# Patient Record
Sex: FEMALE | Race: WHITE | NOT HISPANIC OR LATINO | Employment: OTHER | URBAN - METROPOLITAN AREA
[De-identification: names, ages, dates, MRNs, and addresses within clinical notes are randomized per-mention and may not be internally consistent; named-entity substitution may affect disease eponyms.]

---

## 2017-02-17 ENCOUNTER — TELEPHONE (OUTPATIENT)
Dept: OBSTETRICS AND GYNECOLOGY | Facility: CLINIC | Age: 67
End: 2017-02-17

## 2017-02-17 DIAGNOSIS — Z12.31 VISIT FOR SCREENING MAMMOGRAM: Primary | ICD-10-CM

## 2017-03-06 ENCOUNTER — HOSPITAL ENCOUNTER (OUTPATIENT)
Dept: RADIOLOGY | Facility: OTHER | Age: 67
Discharge: HOME | End: 2017-03-06
Attending: OBSTETRICS & GYNECOLOGY
Payer: COMMERCIAL

## 2017-03-06 ENCOUNTER — OFFICE VISIT (OUTPATIENT)
Dept: OBSTETRICS AND GYNECOLOGY | Facility: CLINIC | Age: 67
End: 2017-03-06
Payer: COMMERCIAL

## 2017-03-06 VITALS
SYSTOLIC BLOOD PRESSURE: 109 MMHG | BODY MASS INDEX: 20.37 KG/M2 | WEIGHT: 107.81 LBS | DIASTOLIC BLOOD PRESSURE: 72 MMHG

## 2017-03-06 DIAGNOSIS — Z12.31 VISIT FOR SCREENING MAMMOGRAM: ICD-10-CM

## 2017-03-06 DIAGNOSIS — M85.80 OSTEOPENIA: ICD-10-CM

## 2017-03-06 DIAGNOSIS — N95.9 MENOPAUSAL AND PERIMENOPAUSAL DISORDER: ICD-10-CM

## 2017-03-06 DIAGNOSIS — Z01.419 VISIT FOR GYNECOLOGIC EXAMINATION: Primary | ICD-10-CM

## 2017-03-06 PROCEDURE — 99396 PREV VISIT EST AGE 40-64: CPT | Mod: S$GLB,,, | Performed by: OBSTETRICS & GYNECOLOGY

## 2017-03-06 PROCEDURE — 99999 PR PBB SHADOW E&M-EST. PATIENT-LVL II: CPT | Mod: PBBFAC,,, | Performed by: OBSTETRICS & GYNECOLOGY

## 2017-03-06 PROCEDURE — 77063 BREAST TOMOSYNTHESIS BI: CPT | Mod: 26,,, | Performed by: RADIOLOGY

## 2017-03-06 PROCEDURE — 77067 SCR MAMMO BI INCL CAD: CPT | Mod: 26,,, | Performed by: RADIOLOGY

## 2017-03-06 PROCEDURE — 77080 DXA BONE DENSITY AXIAL: CPT | Mod: 26,,, | Performed by: RADIOLOGY

## 2017-03-06 PROCEDURE — 77080 DXA BONE DENSITY AXIAL: CPT | Mod: TC

## 2017-03-06 PROCEDURE — 77067 SCR MAMMO BI INCL CAD: CPT | Mod: TC

## 2017-03-06 NOTE — PROGRESS NOTES
HISTORY OF PRESENT ILLNESS:    Zaira Bansal is a 66 y.o. female , presents for a routine exam and has no complaints.  PAP NEXT YEAR, AND MAMMO HAS BEEN SCHEDULED.  HAS HX OF OSTEOPENIA AND NOT CURRENTLY TAKING ACTONEL - F/U BMD AS HAS BEEN YEARS SINCE LAST CHECKED.  RECENT OUSMANE WITH DAUGHTER AND THE GRANDKIDS.  SINCE LAST VISIT MOVED TO [facility].  COMFORT MEASURES FOR URI    LAST VISIT 2016:  PAP DUE AND SUBMITTED; MAMMO HAS BEED SCHEDULED FOR TOMORROW - NO C/O AT ALL  LAST VISIT :  PAP UP TO DATE, DUE  AND MAMMO DUE NEXT MONTH, ORDERED. NO GYN ISSUES  LAST VISIT 2013:  PAP UP TO DATE AND MAMMO ORDERED; HAS ALREADY HAD ORDER FOR DEXA SCAN ENTERED TOO. NO HOT FLUSHES AND VAGINAL DRYNESS.  SON GETTING  IN   LAST ROUTINE VISIT 2012:  DOING WELL, AND NO C/O. HER DAUGHTER JUST HAD A GRANDDAUGHTER IN [place] . . refills FOR MAMMO. PAP SUBMITTED, DISCUSSED 3YR SCREENING INTERVAL. NO T HOT FLUSHES THAT ARE SEVERE, NO SWATI SX   HAPPILY RETIRED FROM [employer]. JUST RECENTLY BOUGHT VACATION HOUSE IN [facility]     History reviewed. No pertinent past medical history.    Past Surgical History:   Procedure Laterality Date    DILATION AND CURETTAGE OF UTERUS          MEDICATIONS AND ALLERGIES:      Current Outpatient Prescriptions:     fluticasone (FLONASE) 50 mcg/actuation nasal spray, , Disp: , Rfl: 11    Review of patient's allergies indicates:   Allergen Reactions    Compazine [prochlorperazine edisylate]        Family History   Problem Relation Age of Onset    Glaucoma Mother     Cancer Other     Uterine cancer Maternal Grandmother     Blindness Neg Hx     Cataracts Neg Hx     Diabetes Neg Hx     Breast cancer Neg Hx     Colon cancer Neg Hx     Ovarian cancer Neg Hx        Social History     Social History    Marital status:      Spouse name: N/A    Number of children: N/A    Years of education: N/A     Occupational History    Not on file.      Social History Main Topics    Smoking status: Never Smoker    Smokeless tobacco: Never Used    Alcohol use Yes    Drug use: No    Sexual activity: Yes     Partners: Male     Birth control/ protection: Post-menopausal     Other Topics Concern    Not on file     Social History Narrative       COMPREHENSIVE GYN HISTORY:  PAP History:  Denies abnormal Paps except a noted above.  Infection History: Denies STDs. Denies PID.  Benign History: Denies uterine fibroids. Denies ovarian cysts. Denies endometriosis.  Denies other conditions.  Cancer History: Denies cervical cancer. Denies uterine cancer or hyperplasia. Denies ovarian cancer. Denies vulvar cancer or pre-cancer. Denies vaginal cancer or pre-cancer. Denies breast cancer. Denies colon cancer.    ROS:  GENERAL: No weight changes. No swelling. No fatigue. No fever.  CARDIOVASCULAR: No chest pain. No shortness of breath. No leg cramps.   NEUROLOGICAL: No headaches. No vision changes.  BREASTS: No pain. No lumps. No discharge.  ABDOMEN: No pain. No nausea. No vomiting. No diarrhea. No constipation.  REPRODUCTIVE: No abnormal bleeding.   VULVA: No pain. No lesions. No itching.  VAGINA: No relaxation. No itching. No odor. No discharge. No lesions.  URINARY: No incontinence. No nocturia. No frequency. No dysuria.    /72  Wt 48.9 kg (107 lb 12.9 oz)  BMI 20.37 kg/m2    PE:  APPEARANCE: Well nourished, well developed, in no acute distress.  AFFECT: WNL, alert and oriented x 3.  SKIN: No hirsutism or acne.  NECK: Neck symmetric without masses or thyromegaly.  NODES: No inguinal, cervical, axillary or femoral lymph node enlargement.  CHEST: Good respiratory effort.   ABDOMEN: Soft. No tenderness or masses. No hepatosplenomegaly. No hernias.  BREASTS: Symmetrical, no skin changes or visible lesions. No palpable masses, nipple discharge bilaterally.  PELVIC: ATROPHIC EXTERNAL FEMALE GENITALIA without lesions. Normal hair distribution. Adequate perineal body,  normal urethral meatus. VAGINA DRY without lesions or discharge. CERVIX STENOTIC without lesions, discharge or tenderness. No significant cystocele or rectocele. Bimanual exam shows uterus to be normal size, regular, mobile and nontender. Adnexa without masses or tenderness.  EXTREMITIES: No edema.    PROCEDURES:  Pap    DIAGNOSIS:  1. Visit for gynecologic examination     2. Menopausal and perimenopausal disorder     3. Osteopenia  DXA Bone Density Spine And Hip_Axial Skeleton       PLAN:    LABS AND TESTS ORDERED:  Mammogram    COUNSELING:  The patient was counseled today on osteoporosis prevention, calcium supplementation, and regular weight bearing exercise. The patient was also counseled today on ACS PAP guidelines, with recommendations for yearly pelvic exams unless their uterus, cervix, and ovaries were removed for benign reasons; in that case, examinations every 3-5 years are recommended.  The patient was also counseled regarding monthly breast self-examination, routine STD screening for at-risk populations, prophylactic immunizations for transmitted infections such as  HPV, Pertussis, or Influenza as appropriate, and yearly mammograms when indicated by ACS guidelines.  She was advised to see her primary care physician for all other health maintenance.    FOLLOW-UP with me annually.

## 2017-09-20 ENCOUNTER — OFFICE VISIT (OUTPATIENT)
Dept: OBSTETRICS AND GYNECOLOGY | Facility: CLINIC | Age: 67
End: 2017-09-20
Payer: COMMERCIAL

## 2017-09-20 VITALS
WEIGHT: 111.13 LBS | DIASTOLIC BLOOD PRESSURE: 70 MMHG | HEIGHT: 60 IN | BODY MASS INDEX: 21.82 KG/M2 | SYSTOLIC BLOOD PRESSURE: 128 MMHG

## 2017-09-20 DIAGNOSIS — R10.31 RLQ ABDOMINAL PAIN: Primary | ICD-10-CM

## 2017-09-20 PROCEDURE — 99214 OFFICE O/P EST MOD 30 MIN: CPT | Mod: S$GLB,,, | Performed by: OBSTETRICS & GYNECOLOGY

## 2017-09-20 PROCEDURE — 99999 PR PBB SHADOW E&M-EST. PATIENT-LVL II: CPT | Mod: PBBFAC,,, | Performed by: OBSTETRICS & GYNECOLOGY

## 2017-09-20 PROCEDURE — 3008F BODY MASS INDEX DOCD: CPT | Mod: S$GLB,,, | Performed by: OBSTETRICS & GYNECOLOGY

## 2017-09-20 NOTE — PROGRESS NOTES
HISTORY OF PRESENT ILLNESS:    Zaira Bansal is a 66 y.o. female , presents C/O PELVIC PAIN SINCE July - WAS SEEN [facility] AND REF TO PHYS THERAPY - WANTS TO BE SURE NOT GYN CAUSE.  3 MO RLQ PAIN, RADIATES FROM BACK, WORSE WHEN INACTIVE AND BETTER WITH ACTIVITY.  WORRIES RE OV NEOPLASM - WILL CHECK PELVIC USG BUT ADVISED MORE LIKELY MUSCULOSKEL OR NERVE IMPINGEMENT AND REASSURED NL EXAM.  ADVISED PT TO F/U WITH PT AND POSS BENEFIT FROM EVAL WITH ORTHO, BACK SPECIALIST    LAST ROUTINE VISIT 3/2017:   PAP NEXT YEAR, AND MAMMO HAS BEEN SCHEDULED.  HAS HX OF OSTEOPENIA AND NOT CURRENTLY TAKING ACTONEL - F/U BMD AS HAS BEEN YEARS SINCE LAST CHECKED.  RECENT OUSMANE WITH DAUGHTER AND THE GRANDKIDS.  SINCE LAST VISIT MOVED TO [facility].  COMFORT MEASURES FOR URI  LAST VISIT 2016:  PAP DUE AND SUBMITTED; MAMMO HAS BEED SCHEDULED FOR TOMORROW - NO C/O AT ALL  LAST VISIT :  PAP UP TO DATE, DUE  AND MAMMO DUE NEXT MONTH, ORDERED. NO GYN ISSUES  LAST VISIT 2013:  PAP UP TO DATE AND MAMMO ORDERED; HAS ALREADY HAD ORDER FOR DEXA SCAN ENTERED TOO. NO HOT FLUSHES AND VAGINAL DRYNESS.  SON GETTING  IN   LAST ROUTINE VISIT 2012:  DOING WELL, AND NO C/O. HER DAUGHTER JUST HAD A GRANDDAUGHTER IN [place] . . refills FOR MAMMO. PAP SUBMITTED, DISCUSSED 3YR SCREENING INTERVAL. NO T HOT FLUSHES THAT ARE SEVERE, NO SWATI SX   HAPPILY RETIRED FROM [employer]. JUST RECENTLY BOUGHT VACATION HOUSE IN [facility]     History reviewed. No pertinent past medical history.    Past Surgical History:   Procedure Laterality Date    DILATION AND CURETTAGE OF UTERUS          MEDICATIONS AND ALLERGIES:      Current Outpatient Prescriptions:     metaxalone (SKELAXIN) 800 MG tablet, Take 1 tablet (800 mg total) by mouth 3 (three) times daily., Disp: 30 tablet, Rfl: 3    Review of patient's allergies indicates:   Allergen Reactions    Compazine [prochlorperazine edisylate]        Family History   Problem  Relation Age of Onset    Glaucoma Mother     Lung cancer Father     Cancer Other     Uterine cancer Maternal Grandmother     Blindness Neg Hx     Cataracts Neg Hx     Diabetes Neg Hx     Breast cancer Neg Hx     Colon cancer Neg Hx     Ovarian cancer Neg Hx        Social History     Social History    Marital status:      Spouse name: N/A    Number of children: N/A    Years of education: N/A     Occupational History    Not on file.     Social History Main Topics    Smoking status: Never Smoker    Smokeless tobacco: Never Used    Alcohol use Yes    Drug use: No    Sexual activity: Yes     Partners: Male     Birth control/ protection: Post-menopausal     Other Topics Concern    Not on file     Social History Narrative    No narrative on file       COMPREHENSIVE GYN HISTORY:  PAP History:  Denies abnormal Paps except a noted above.  Infection History: Denies STDs. Denies PID.  Benign History: Denies uterine fibroids. Denies ovarian cysts. Denies endometriosis.  Denies other conditions.  Cancer History: Denies cervical cancer. Denies uterine cancer or hyperplasia. Denies ovarian cancer. Denies vulvar cancer or pre-cancer. Denies vaginal cancer or pre-cancer. Denies breast cancer. Denies colon cancer.    ROS:  GENERAL: No weight changes. No swelling. No fatigue. No fever.  CARDIOVASCULAR: No chest pain. No shortness of breath. No leg cramps.   NEUROLOGICAL: No headaches. No vision changes.  BREASTS: No pain. No lumps. No discharge.  ABDOMEN: No nausea. No vomiting. No diarrhea. No constipation.  REPRODUCTIVE: No abnormal bleeding.   VULVA: No pain. No lesions. No itching.  VAGINA: No relaxation. No itching. No odor. No discharge. No lesions.  URINARY: No incontinence. No nocturia. No frequency. No dysuria.    /70   Ht 5' (1.524 m)   Wt 50.4 kg (111 lb 1.8 oz)   BMI 21.70 kg/m²     PE:  CHEST: Good respiratory effort.   ABDOMEN: Soft. No tenderness or masses. No hepatosplenomegaly. No  hernias.  BREASTS: Symmetrical, no skin changes or visible lesions. No palpable masses, nipple discharge bilaterally.  PELVIC: ATROPHIC EXTERNAL FEMALE GENITALIA without lesions. Normal hair distribution. Adequate perineal body, normal urethral meatus. VAGINA DRY without lesions or discharge. CERVIX STENOTIC without lesions, discharge or tenderness. No significant cystocele or rectocele. Bimanual exam shows uterus to be normal size, regular, mobile and nontender. Adnexa without masses or tenderness.  EXTREMITIES: No edema.    PROCEDURES:      DIAGNOSIS:  1. RLQ abdominal pain  US Pelvis Comp with Transvag NON-OB (xpd       PLAN:    LABS AND TESTS ORDERED:      COUNSELING:  The patient was counseled today on CAUSES OF PELVIC PAIN    FOLLOW-UP with me annually.

## 2017-09-22 ENCOUNTER — HOSPITAL ENCOUNTER (OUTPATIENT)
Dept: RADIOLOGY | Facility: HOSPITAL | Age: 67
Discharge: HOME | End: 2017-09-22
Attending: OBSTETRICS & GYNECOLOGY
Payer: COMMERCIAL

## 2017-09-22 DIAGNOSIS — R10.31 RLQ ABDOMINAL PAIN: ICD-10-CM

## 2017-09-22 PROCEDURE — 76830 TRANSVAGINAL US NON-OB: CPT | Mod: 26,,, | Performed by: RADIOLOGY

## 2017-09-22 PROCEDURE — 76856 US EXAM PELVIC COMPLETE: CPT | Mod: TC,PO

## 2017-09-22 PROCEDURE — 76856 US EXAM PELVIC COMPLETE: CPT | Mod: 26,,, | Performed by: RADIOLOGY

## 2017-09-26 ENCOUNTER — TELEPHONE (OUTPATIENT)
Dept: OBSTETRICS AND GYNECOLOGY | Facility: CLINIC | Age: 67
End: 2017-09-26

## 2017-09-26 NOTE — TELEPHONE ENCOUNTER
REASSURED FIBROID SMALL, SHOULD BE ASYMPTOMATIC AND NOT CAUSE FOR BACK PAIN      Transabdominal and transvaginal ultrasound evaluation of the pelvis was performed.    Findings:    The uterus measuresthe uterus measures 6.2 x 2.6 x 3.7 cm in dimension.  Endometrial stripe measures 2 mm.  There are 2 intramural uterine fundal fibroids present.  The larger fibroid is calcified and measures 2.4 x 2.1 x 2.5 cm within the right anterior uterine fundus.. There is a small left posterior fundal intramural fibroid which measures 1.1 x 0.7 x 1.1 cm.  The endometrial stripe measures 2 mm.  There is a small endometrial calcification present.    The right ovary measures 2.1 x 1.0 x 1.1 cm.  The left ovary measures 2.0 x 1.1 x 1.3 cm.  No solid or cystic adnexal mass.  No free fluid in the cul-de-sac.   Impression      Uterine fibroids         ----- Message from Ester Betancourt MA sent at 9/26/2017 10:42 AM CDT -----      ----- Message -----  From: Atif Nye  Sent: 9/25/2017  11:37 AM  To: Ileana DOMÍNGUEZ Staff    Please call pt she has some question regarding the results of her u/s [phone]

## 2017-10-11 ENCOUNTER — OFFICE VISIT (OUTPATIENT)
Dept: SPINE | Facility: CLINIC | Age: 67
End: 2017-10-11
Payer: MEDICARE

## 2017-10-11 VITALS
DIASTOLIC BLOOD PRESSURE: 68 MMHG | SYSTOLIC BLOOD PRESSURE: 101 MMHG | HEIGHT: 61 IN | WEIGHT: 110.25 LBS | HEART RATE: 80 BPM | BODY MASS INDEX: 20.82 KG/M2

## 2017-10-11 DIAGNOSIS — M54.5 ACUTE RIGHT-SIDED LOW BACK PAIN, WITH SCIATICA PRESENCE UNSPECIFIED: ICD-10-CM

## 2017-10-11 DIAGNOSIS — M25.551 PAIN OF RIGHT HIP JOINT: Primary | ICD-10-CM

## 2017-10-11 PROCEDURE — 99203 OFFICE O/P NEW LOW 30 MIN: CPT | Mod: S$PBB,,, | Performed by: PHYSICIAN ASSISTANT

## 2017-10-11 PROCEDURE — 99214 OFFICE O/P EST MOD 30 MIN: CPT | Mod: PBBFAC,PN | Performed by: PHYSICIAN ASSISTANT

## 2017-10-11 PROCEDURE — 99999 PR PBB SHADOW E&M-EST. PATIENT-LVL IV: CPT | Mod: PBBFAC,,, | Performed by: PHYSICIAN ASSISTANT

## 2017-10-12 NOTE — PROGRESS NOTES
Neurosurgery History & Physical    Patient ID: Zaira Bansal is a 66 y.o. female.    Chief Complaint   Patient presents with    Low-back Pain     and around right front       Review of Systems   Constitutional: Negative for activity change, appetite change, chills, fever and unexpected weight change.   HENT: Negative for tinnitus, trouble swallowing and voice change.    Respiratory: Negative for apnea, cough, chest tightness and shortness of breath.    Cardiovascular: Negative for chest pain and palpitations.   Gastrointestinal: Negative for constipation, diarrhea, nausea and vomiting.   Genitourinary: Negative for difficulty urinating, dysuria, frequency and urgency.   Musculoskeletal: Positive for arthralgias and back pain. Negative for gait problem, neck pain and neck stiffness.   Skin: Negative for wound.   Neurological: Negative for dizziness, tremors, seizures, facial asymmetry, speech difficulty, weakness, light-headedness, numbness and headaches.   Psychiatric/Behavioral: Negative for confusion and decreased concentration.       No past medical history on file.  Social History     Social History    Marital status:      Spouse name: N/A    Number of children: N/A    Years of education: N/A     Occupational History    Not on file.     Social History Main Topics    Smoking status: Never Smoker    Smokeless tobacco: Never Used    Alcohol use Yes    Drug use: No    Sexual activity: Yes     Partners: Male     Birth control/ protection: Post-menopausal     Other Topics Concern    Not on file     Social History Narrative    No narrative on file     Family History   Problem Relation Age of Onset    Glaucoma Mother     Lung cancer Father     Cancer Other     Uterine cancer Maternal Grandmother     Blindness Neg Hx     Cataracts Neg Hx     Diabetes Neg Hx     Breast cancer Neg Hx     Colon cancer Neg Hx     Ovarian cancer Neg Hx      Review of patient's allergies indicates:   Allergen  "Reactions    Compazine [prochlorperazine edisylate]        Current Outpatient Prescriptions:     metaxalone (SKELAXIN) 800 MG tablet, Take 1 tablet (800 mg total) by mouth 3 (three) times daily., Disp: 30 tablet, Rfl: 3    Vitals:    10/11/17 1401   BP: 101/68   BP Location: Right arm   Patient Position: Sitting   BP Method: Medium (Automatic)   Pulse: 80   Weight: 50 kg (110 lb 3.7 oz)   Height: 5' 1" (1.549 m)       Physical Exam   Constitutional: She is oriented to person, place, and time. She appears well-developed and well-nourished.   HENT:   Head: Normocephalic and atraumatic.   Eyes: Pupils are equal, round, and reactive to light.   Neck: Normal range of motion. Neck supple.   Cardiovascular: Normal rate.    Pulmonary/Chest: Effort normal.   Musculoskeletal: Normal range of motion. She exhibits no edema.   Right hip pain with internal/ external rotation of the right hip   Neurological: She is alert and oriented to person, place, and time. She has a normal Finger-Nose-Finger Test, a normal Heel to Shin Test, a normal Romberg Test and a normal Tandem Gait Test. Gait normal.   Reflex Scores:       Tricep reflexes are 2+ on the right side and 2+ on the left side.       Bicep reflexes are 2+ on the right side and 2+ on the left side.       Brachioradialis reflexes are 2+ on the right side and 2+ on the left side.       Patellar reflexes are 2+ on the right side and 2+ on the left side.       Achilles reflexes are 2+ on the right side and 2+ on the left side.  Skin: Skin is warm, dry and intact.   Psychiatric: She has a normal mood and affect. Her speech is normal and behavior is normal. Judgment and thought content normal.   Nursing note and vitals reviewed.      Neurologic Exam     Mental Status   Oriented to person, place, and time.   Oriented to person.   Oriented to place.   Oriented to time.   Follows 3 step commands.   Attention: normal. Concentration: normal.   Speech: speech is normal   Level of " consciousness: alert  Knowledge: consistent with education.   Able to name object. Able to read. Able to repeat. Able to write. Normal comprehension.     Cranial Nerves     CN II   Visual acuity: normal  Right visual field deficit: none  Left visual field deficit: none     CN III, IV, VI   Pupils are equal, round, and reactive to light.  Right pupil: Size: 3 mm. Shape: regular. Reactivity: brisk. Consensual response: intact.   Left pupil: Size: 3 mm. Shape: regular. Reactivity: brisk. Consensual response: intact.   CN III: no CN III palsy  CN VI: no CN VI palsy  Nystagmus: none   Diplopia: none  Ophthalmoparesis: none  Conjugate gaze: present    CN V   Right facial sensation deficit: none  Left facial sensation deficit: none    CN VII   Right facial weakness: none  Left facial weakness: none    CN VIII   Hearing: intact    CN IX, X   CN IX normal.   CN X normal.     CN XI   Right sternocleidomastoid strength: normal  Left sternocleidomastoid strength: normal  Right trapezius strength: normal  Left trapezius strength: normal    CN XII   Fasciculations: absent  Tongue deviation: none    Motor Exam   Muscle bulk: normal  Overall muscle tone: normal  Right arm pronator drift: absent  Left arm pronator drift: absent    Strength   Right neck flexion: 5/5  Left neck flexion: 5/5  Right neck extension: 5/5  Left neck extension: 5/5  Right deltoid: 5/5  Left deltoid: 5/5  Right biceps: 5/5  Left biceps: 5/5  Right triceps: 5/5  Left triceps: 5/5  Right wrist flexion: 5/5  Left wrist flexion: 5/5  Right wrist extension: 5/5  Left wrist extension: 5/5  Right interossei: 5/5  Left interossei: 5/5  Right abdominals: 5/5  Left abdominals: 5/5  Right iliopsoas: 5/5  Left iliopsoas: 5/5  Right quadriceps: 5/5  Left quadriceps: 5/5  Right hamstrin/5  Left hamstrin/5  Right glutei: 5/5  Left glutei: 5/5  Right anterior tibial: 5/5  Left anterior tibial: 5/5  Right posterior tibial: 5/5  Left posterior tibial: 5/5  Right  peroneal: 5/5  Left peroneal: 5/5  Right gastroc: 5/5  Left gastroc: 5/5    Sensory Exam   Right arm light touch: normal  Left arm light touch: normal  Right leg light touch: normal  Left leg light touch: normal  Right arm vibration: normal  Left arm vibration: normal  Right arm pinprick: normal  Left arm pinprick: normal  Sensory deficit distribution on right: L1    Gait, Coordination, and Reflexes     Gait  Gait: normal    Coordination   Romberg: negative  Finger to nose coordination: normal  Heel to shin coordination: normal  Tandem walking coordination: normal    Tremor   Resting tremor: absent  Intention tremor: absent  Action tremor: absent    Reflexes   Right brachioradialis: 2+  Left brachioradialis: 2+  Right biceps: 2+  Left biceps: 2+  Right triceps: 2+  Left triceps: 2+  Right patellar: 2+  Left patellar: 2+  Right achilles: 2+  Left achilles: 2+  Right Traore: absent  Left Traore: absent  Right ankle clonus: absent  Left ankle clonus: absent      Provider dictation:  66 year old  female with osteopenia is self referred for right lower back and hip pain.  She has had pain since June 2017 that started as a gradual ache across the lower back.  Pain has progressed and is now constant in the right lower back radiating to the right groin.  It is worse in the morning upon waking and improves with walking and the use of ice.  She feels numbness in the right hip and lateral/ superior thigh.  She has tried skelaxin, ibuprofen and naproxen for pain.  PT has not helped her over the last 2-3 months.  She has not had SILVA.  Oswestry score: 18%.  PHQ:  1.    On exam, she has decreased sensation in a right L1 distribution and pain is elicitted in the right hip with internal/ external rotation of the right hip joint.    She has not had any imaging of the lumbar spine or hip to review.    Mrs. Bansal has acute onset right lower back/ hip pain with possible right L1 radiculopathy.  She has not had relief with  NSAIDs, muscle relaxants or PT.  We will obtain an MRI lumbar spine as well as lumbar and hip xrays to assess for source of pain.  Follow up in clinic after imaging is complete.     Visit Diagnosis:  Pain of right hip joint  -     X-Ray Hip 2 View Right; Future; Expected date: 10/11/2017    Acute right-sided low back pain, with sciatica presence unspecified  -     MRI Lumbar Spine Without Contrast; Future; Expected date: 10/11/2017  -     X-Ray Lumbar Complete With Flex And Ext; Future; Expected date: 10/11/2017        Total time spent counseling greater than fifty percent of total visit time.  Counseling included discussion regarding imaging findings, diagnosis possibilities, treatment options, risks and benefits.   The patient had many questions regarding the options and long-term effects.

## 2017-10-17 PROBLEM — M62.830 SPASM OF MUSCLE, BACK: Status: ACTIVE | Noted: 2017-10-17

## 2017-11-06 ENCOUNTER — OFFICE VISIT (OUTPATIENT)
Dept: SPINE | Facility: CLINIC | Age: 67
End: 2017-11-06
Payer: MEDICARE

## 2017-11-06 ENCOUNTER — HOSPITAL ENCOUNTER (OUTPATIENT)
Dept: RADIOLOGY | Facility: HOSPITAL | Age: 67
Discharge: HOME | End: 2017-11-06
Attending: PHYSICIAN ASSISTANT
Payer: MEDICARE

## 2017-11-06 VITALS
DIASTOLIC BLOOD PRESSURE: 68 MMHG | HEIGHT: 61 IN | BODY MASS INDEX: 21.1 KG/M2 | SYSTOLIC BLOOD PRESSURE: 105 MMHG | HEART RATE: 72 BPM | WEIGHT: 111.75 LBS

## 2017-11-06 DIAGNOSIS — M54.5 ACUTE RIGHT-SIDED LOW BACK PAIN, WITH SCIATICA PRESENCE UNSPECIFIED: ICD-10-CM

## 2017-11-06 DIAGNOSIS — M54.5 ACUTE RIGHT-SIDED LOW BACK PAIN, WITH SCIATICA PRESENCE UNSPECIFIED: Primary | ICD-10-CM

## 2017-11-06 DIAGNOSIS — Q76.2 CONGENITAL SPONDYLOLISTHESIS OF LUMBAR REGION: ICD-10-CM

## 2017-11-06 DIAGNOSIS — M25.551 PAIN OF RIGHT HIP JOINT: ICD-10-CM

## 2017-11-06 DIAGNOSIS — M47.816 LUMBAR SPONDYLOSIS: ICD-10-CM

## 2017-11-06 PROCEDURE — 72114 X-RAY EXAM L-S SPINE BENDING: CPT | Mod: TC,PO

## 2017-11-06 PROCEDURE — 99214 OFFICE O/P EST MOD 30 MIN: CPT | Mod: S$PBB,,, | Performed by: PHYSICIAN ASSISTANT

## 2017-11-06 PROCEDURE — 99213 OFFICE O/P EST LOW 20 MIN: CPT | Mod: PBBFAC,25,PN | Performed by: PHYSICIAN ASSISTANT

## 2017-11-06 PROCEDURE — 72114 X-RAY EXAM L-S SPINE BENDING: CPT | Mod: 26,,, | Performed by: RADIOLOGY

## 2017-11-06 PROCEDURE — 73502 X-RAY EXAM HIP UNI 2-3 VIEWS: CPT | Mod: TC,PO,RT

## 2017-11-06 PROCEDURE — 99999 PR PBB SHADOW E&M-EST. PATIENT-LVL III: CPT | Mod: PBBFAC,,, | Performed by: PHYSICIAN ASSISTANT

## 2017-11-06 PROCEDURE — 72148 MRI LUMBAR SPINE W/O DYE: CPT | Mod: TC,PO

## 2017-11-06 PROCEDURE — 73502 X-RAY EXAM HIP UNI 2-3 VIEWS: CPT | Mod: 26,RT,, | Performed by: RADIOLOGY

## 2017-11-06 PROCEDURE — 72148 MRI LUMBAR SPINE W/O DYE: CPT | Mod: 26,,, | Performed by: RADIOLOGY

## 2017-11-06 NOTE — PROGRESS NOTES
Neurosurgery History & Physical    Patient ID: Zaira Bansal is a 67 y.o. female.    Chief Complaint   Patient presents with    Low-back Pain    Sciatica     comes across the right front       Review of Systems   Constitutional: Negative for activity change, appetite change, chills, fever and unexpected weight change.   HENT: Negative for tinnitus, trouble swallowing and voice change.    Respiratory: Negative for apnea, cough, chest tightness and shortness of breath.    Cardiovascular: Negative for chest pain and palpitations.   Gastrointestinal: Negative for constipation, diarrhea, nausea and vomiting.   Genitourinary: Negative for difficulty urinating, dysuria, frequency and urgency.   Musculoskeletal: Positive for arthralgias and back pain. Negative for gait problem, neck pain and neck stiffness.   Skin: Negative for wound.   Neurological: Negative for dizziness, tremors, seizures, facial asymmetry, speech difficulty, weakness, light-headedness, numbness and headaches.   Psychiatric/Behavioral: Negative for confusion and decreased concentration.       No past medical history on file.  Social History     Social History    Marital status:      Spouse name: N/A    Number of children: N/A    Years of education: N/A     Occupational History    Not on file.     Social History Main Topics    Smoking status: Never Smoker    Smokeless tobacco: Never Used    Alcohol use Yes    Drug use: No    Sexual activity: Yes     Partners: Male     Birth control/ protection: Post-menopausal     Other Topics Concern    Not on file     Social History Narrative    No narrative on file     Family History   Problem Relation Age of Onset    Glaucoma Mother     Lung cancer Father     Cancer Other     Uterine cancer Maternal Grandmother     Blindness Neg Hx     Cataracts Neg Hx     Diabetes Neg Hx     Breast cancer Neg Hx     Colon cancer Neg Hx     Ovarian cancer Neg Hx      Review of patient's allergies  "indicates:   Allergen Reactions    Compazine [prochlorperazine edisylate]        Current Outpatient Prescriptions:     methocarbamol (ROBAXIN) 750 MG Tab, Take 1 tablet (750 mg total) by mouth 3 (three) times daily., Disp: 60 tablet, Rfl: 3    Vitals:    11/06/17 1258   BP: 105/68   BP Location: Right arm   Patient Position: Sitting   BP Method: Medium (Automatic)   Pulse: 72   Weight: 50.7 kg (111 lb 12.4 oz)   Height: 5' 1" (1.549 m)       Physical Exam   Constitutional: She is oriented to person, place, and time. She appears well-developed and well-nourished.   HENT:   Head: Normocephalic and atraumatic.   Eyes: Pupils are equal, round, and reactive to light.   Neck: Normal range of motion. Neck supple.   Cardiovascular: Normal rate.    Pulmonary/Chest: Effort normal.   Musculoskeletal: Normal range of motion. She exhibits no edema.   Right hip pain with internal/ external rotation of the right hip   Neurological: She is alert and oriented to person, place, and time. She has a normal Finger-Nose-Finger Test, a normal Heel to Shin Test, a normal Romberg Test and a normal Tandem Gait Test. Gait normal.   Reflex Scores:       Tricep reflexes are 2+ on the right side and 2+ on the left side.       Bicep reflexes are 2+ on the right side and 2+ on the left side.       Brachioradialis reflexes are 2+ on the right side and 2+ on the left side.       Patellar reflexes are 2+ on the right side and 2+ on the left side.       Achilles reflexes are 2+ on the right side and 2+ on the left side.  Skin: Skin is warm, dry and intact.   Psychiatric: She has a normal mood and affect. Her speech is normal and behavior is normal. Judgment and thought content normal.   Nursing note and vitals reviewed.      Neurologic Exam     Mental Status   Oriented to person, place, and time.   Oriented to person.   Oriented to place.   Oriented to time.   Follows 3 step commands.   Attention: normal. Concentration: normal.   Speech: speech is " normal   Level of consciousness: alert  Knowledge: consistent with education.   Able to name object. Able to read. Able to repeat. Able to write. Normal comprehension.     Cranial Nerves     CN II   Visual acuity: normal  Right visual field deficit: none  Left visual field deficit: none     CN III, IV, VI   Pupils are equal, round, and reactive to light.  Right pupil: Size: 3 mm. Shape: regular. Reactivity: brisk. Consensual response: intact.   Left pupil: Size: 3 mm. Shape: regular. Reactivity: brisk. Consensual response: intact.   CN III: no CN III palsy  CN VI: no CN VI palsy  Nystagmus: none   Diplopia: none  Ophthalmoparesis: none  Conjugate gaze: present    CN V   Right facial sensation deficit: none  Left facial sensation deficit: none    CN VII   Right facial weakness: none  Left facial weakness: none    CN VIII   Hearing: intact    CN IX, X   CN IX normal.   CN X normal.     CN XI   Right sternocleidomastoid strength: normal  Left sternocleidomastoid strength: normal  Right trapezius strength: normal  Left trapezius strength: normal    CN XII   Fasciculations: absent  Tongue deviation: none    Motor Exam   Muscle bulk: normal  Overall muscle tone: normal  Right arm pronator drift: absent  Left arm pronator drift: absent    Strength   Right neck flexion: 5/5  Left neck flexion: 5/5  Right neck extension: 5/5  Left neck extension: 5/5  Right deltoid: 5/5  Left deltoid: 5/5  Right biceps: 5/5  Left biceps: 5/5  Right triceps: 5/5  Left triceps: 5/5  Right wrist flexion: 5/5  Left wrist flexion: 5/5  Right wrist extension: 5/5  Left wrist extension: 5/5  Right interossei: 5/5  Left interossei: 5/5  Right abdominals: 5/5  Left abdominals: 5/5  Right iliopsoas: 5/5  Left iliopsoas: 5/5  Right quadriceps: 5/5  Left quadriceps: 5/5  Right hamstrin/5  Left hamstrin/5  Right glutei: 5/5  Left glutei: 5/5  Right anterior tibial: 5/5  Left anterior tibial: 5/5  Right posterior tibial: 5/5  Left posterior  tibial: 5/5  Right peroneal: 5/5  Left peroneal: 5/5  Right gastroc: 5/5  Left gastroc: 5/5    Sensory Exam   Right arm light touch: normal  Left arm light touch: normal  Right leg light touch: normal  Left leg light touch: normal  Right arm vibration: normal  Left arm vibration: normal  Right arm pinprick: normal  Left arm pinprick: normal  Sensory deficit distribution on right: L1    Gait, Coordination, and Reflexes     Gait  Gait: normal    Coordination   Romberg: negative  Finger to nose coordination: normal  Heel to shin coordination: normal  Tandem walking coordination: normal    Tremor   Resting tremor: absent  Intention tremor: absent  Action tremor: absent    Reflexes   Right brachioradialis: 2+  Left brachioradialis: 2+  Right biceps: 2+  Left biceps: 2+  Right triceps: 2+  Left triceps: 2+  Right patellar: 2+  Left patellar: 2+  Right achilles: 2+  Left achilles: 2+  Right Traore: absent  Left Traore: absent  Right ankle clonus: absent  Left ankle clonus: absent      Provider dictation:  66 year old  female with osteopenia presents for follow up of right lower back and hip pain after undergoing xrays and an MRI.  She has had pain since June 2017 that started as a gradual ache across the lower back.  Pain has progressed and is now constant in the right lower back radiating to the right groin.  It is worse in the morning upon waking and improves with walking and the use of ice.  She feels numbness in the right hip and lateral/ superior thigh.  Since last visit, pain has significantly improved, but is still present.  She has tried skelaxin, ibuprofen and naproxen for pain.  PT has not helped her over the last 2-3 months, but she admits to not being consistent in attending due to recent travel.  She has not had SILVA.  Oswestry score: 18%.  PHQ:  1.    On exam, she has decreased sensation in a right L1 distribution and pain is elicitted in the right hip with internal/ external rotation of the right  hip joint.    I have reviewed an xray of the right up demonstrating a fairly normal study without significant abnormality.    I have reviewed lumbar flexion/ extension studies and an MRI of the lumbar spine demonstrating retrolisthesis of L1 on L2, anterolisthesis of L3 on L4 and L4 on L5.  Bilateral pars defects are seen at L5.  There is no evidence of instability on flexion/ extension films.  Multi level degenerative changes are seen throughout the lumbar spine with facet effusions at L1/2 and L2/3.  At L3/4 there is a broad based disk with moderate central canal narrowing and right greater than left foraminal narrowing.  At L5/S1 there is a broad based disc with right greater than left foraminal narrowing.  Tarlov cysts are seen posterior to S2.    Mrs. Bansal has acute onset right lower back/ hip pain.  Overall pain is improving.  She does have multilevel degenerative changes with L5 pars defect bilaterally and no evidence of instability.  The greatest region of abnormality is at L3/4.  She does not have an L3 or L4 radiculopathy.  Her pain is focused in the lower back and right groin, without significant nerve compression at L1/2.  Without out any significant upper lumbar pathology to support her groin pain, I do not recommend any surgical intervention at this time.  We have discussed consitent PT and considering epidural steroid injections with pain management. She will try PT at this time and I have placed a referral.  If no improvement, we will refer to Pain management.     Visit Diagnosis:  Acute right-sided low back pain, with sciatica presence unspecified  -     Ambulatory Referral to Physical/Occupational Therapy    Pain of right hip joint  -     Ambulatory Referral to Physical/Occupational Therapy    Lumbar spondylosis  -     Ambulatory Referral to Physical/Occupational Therapy    Congenital spondylolisthesis of lumbar region  -     Ambulatory Referral to Physical/Occupational Therapy        Total  time spent counseling greater than fifty percent of total visit time.  Counseling included discussion regarding imaging findings, diagnosis possibilities, treatment options, risks and benefits.   The patient had many questions regarding the options and long-term effects.

## 2018-02-05 ENCOUNTER — TELEPHONE (OUTPATIENT)
Dept: OBSTETRICS AND GYNECOLOGY | Facility: CLINIC | Age: 68
End: 2018-02-05

## 2018-02-05 DIAGNOSIS — Z12.39 SCREENING BREAST EXAMINATION: Primary | ICD-10-CM

## 2018-02-05 NOTE — TELEPHONE ENCOUNTER
----- Message from Herbert Randhawa sent at 2/5/2018  9:36 AM CST -----  Contact: pt  x_ 1st Request  _ 2nd Request  _ 3rd Request    Who: pt    Why: [facility] order request    What Number to Call Back: [phone]    When to Expect a call back: (Before the end of the day)  -- if call after 3:00 call back will be tomorrow.

## 2018-03-07 ENCOUNTER — OFFICE VISIT (OUTPATIENT)
Dept: OBSTETRICS AND GYNECOLOGY | Facility: CLINIC | Age: 68
End: 2018-03-07
Payer: MEDICARE

## 2018-03-07 ENCOUNTER — HOSPITAL ENCOUNTER (OUTPATIENT)
Dept: RADIOLOGY | Facility: OTHER | Age: 68
Discharge: HOME | End: 2018-03-07
Attending: OBSTETRICS & GYNECOLOGY
Payer: MEDICARE

## 2018-03-07 VITALS
BODY MASS INDEX: 21.1 KG/M2 | SYSTOLIC BLOOD PRESSURE: 126 MMHG | WEIGHT: 111.75 LBS | DIASTOLIC BLOOD PRESSURE: 80 MMHG | HEIGHT: 61 IN

## 2018-03-07 DIAGNOSIS — Z01.419 VISIT FOR GYNECOLOGIC EXAMINATION: Primary | ICD-10-CM

## 2018-03-07 DIAGNOSIS — N95.9 MENOPAUSAL AND PERIMENOPAUSAL DISORDER: ICD-10-CM

## 2018-03-07 DIAGNOSIS — Z12.39 SCREENING BREAST EXAMINATION: ICD-10-CM

## 2018-03-07 PROCEDURE — 99999 PR PBB SHADOW E&M-EST. PATIENT-LVL II: CPT | Mod: PBBFAC,,, | Performed by: OBSTETRICS & GYNECOLOGY

## 2018-03-07 PROCEDURE — 77067 SCR MAMMO BI INCL CAD: CPT | Mod: 26,,, | Performed by: INTERNAL MEDICINE

## 2018-03-07 PROCEDURE — 77067 SCR MAMMO BI INCL CAD: CPT | Mod: TC

## 2018-03-07 PROCEDURE — 99212 OFFICE O/P EST SF 10 MIN: CPT | Mod: PBBFAC,25 | Performed by: OBSTETRICS & GYNECOLOGY

## 2018-03-07 PROCEDURE — 77063 BREAST TOMOSYNTHESIS BI: CPT | Mod: 26,,, | Performed by: INTERNAL MEDICINE

## 2018-03-07 PROCEDURE — G0101 CA SCREEN;PELVIC/BREAST EXAM: HCPCS | Mod: S$PBB,,, | Performed by: OBSTETRICS & GYNECOLOGY

## 2018-03-07 PROCEDURE — G0101 CA SCREEN;PELVIC/BREAST EXAM: HCPCS | Mod: PBBFAC | Performed by: OBSTETRICS & GYNECOLOGY

## 2018-03-07 PROCEDURE — 88175 CYTOPATH C/V AUTO FLUID REDO: CPT

## 2018-03-07 RX ORDER — CLOBETASOL PROPIONATE 0.05 G/ML
SPRAY TOPICAL
Refills: 0 | COMMUNITY
Start: 2018-01-06

## 2018-03-07 NOTE — PROGRESS NOTES
HISTORY OF PRESENT ILLNESS:    Zaira Bansal is a 67 y.o. female , presents for a routine exam and has no complaints.  PAP TODAY AND HAD MAMMO EARLIER TODAY.  NO GYN C/O EXCEPT SOME DRYNESS AND DISCUSSED LIQUIBEADS.  NO HF, SWATI.  EXPECTING 4TH GRANDCHILD SOON, LIVING IN [place]    LAST VISIT 2017:  C/O PELVIC PAIN SINCE July - [place] AND REF TO PHYS THERAPY - WANTS TO BE SURE NOT GYN CAUSE.  3 MO RLQ PAIN, RADIATES FROM BACK, WORSE WHEN INACTIVE AND BETTER WITH ACTIVITY.  WORRIES RE OV NEOPLASM - WILL CHECK PELVIC USG BUT ADVISED MORE LIKELY MUSCULOSKEL OR NERVE IMPINGEMENT AND REASSURED NL EXAM.  ADVISED PT TO F/U WITH PT AND POSS BENEFIT FROM EVAL WITH ORTHO, BACK SPECIALIST  LAST ROUTINE VISIT 3/2017:   PAP NEXT YEAR, AND MAMMO HAS BEEN SCHEDULED.  HAS HX OF OSTEOPENIA AND NOT CURRENTLY TAKING ACTONEL - F/U BMD AS HAS BEEN YEARS SINCE LAST CHECKED.  RECENT OUSMANE WITH DAUGHTER AND THE GRANDKIDS.  SINCE LAST VISIT MOVED TO [facility].  COMFORT MEASURES FOR URI  LAST VISIT 2016:  PAP DUE AND SUBMITTED; MAMMO HAS BEED SCHEDULED FOR TOMORROW - NO C/O AT ALL  LAST VISIT :  PAP UP TO DATE, DUE  AND MAMMO DUE NEXT MONTH, ORDERED. NO GYN ISSUES  LAST VISIT 2013:  PAP UP TO DATE AND MAMMO ORDERED; HAS ALREADY HAD ORDER FOR DEXA SCAN ENTERED TOO. NO HOT FLUSHES AND VAGINAL DRYNESS.  SON GETTING  IN   LAST ROUTINE VISIT 2012:  DOING WELL, AND NO C/O. HER DAUGHTER JUST HAD A GRANDDAUGHTER IN [place] . . refills FOR MAMMO. PAP SUBMITTED, DISCUSSED 3YR SCREENING INTERVAL. NO T HOT FLUSHES THAT ARE SEVERE, NO SWATI SX   HAPPILY RETIRED FROM [employer]. JUST RECENTLY BOUGHT VACATION HOUSE IN [facility]     History reviewed. No pertinent past medical history.    Past Surgical History:   Procedure Laterality Date    DILATION AND CURETTAGE OF UTERUS          MEDICATIONS AND ALLERGIES:      Current Outpatient Prescriptions:     clobetasol (CLOBEX) 0.05 % topical spray,  APPLY TO SKIN TWICE DAILY AS NEEDED FOR ITCHING. USE SPARINGLY, CAN CAUSE THINNING OF SKIN, Disp: , Rfl: 0    Review of patient's allergies indicates:   Allergen Reactions    Compazine [prochlorperazine edisylate]        Family History   Problem Relation Age of Onset    Glaucoma Mother     Lung cancer Father     Cancer Other     Uterine cancer Maternal Grandmother     Blindness Neg Hx     Cataracts Neg Hx     Diabetes Neg Hx     Breast cancer Neg Hx     Colon cancer Neg Hx     Ovarian cancer Neg Hx        Social History     Social History    Marital status:      Spouse name: N/A    Number of children: N/A    Years of education: N/A     Occupational History    Not on file.     Social History Main Topics    Smoking status: Never Smoker    Smokeless tobacco: Never Used    Alcohol use Yes    Drug use: No    Sexual activity: Yes     Partners: Male     Birth control/ protection: Post-menopausal     Other Topics Concern    Not on file     Social History Narrative    No narrative on file       COMPREHENSIVE GYN HISTORY:  PAP History:  Denies abnormal Paps except a noted above.  Infection History: Denies STDs. Denies PID.  Benign History: Denies uterine fibroids. Denies ovarian cysts. Denies endometriosis.  Denies other conditions.  Cancer History: Denies cervical cancer. Denies uterine cancer or hyperplasia. Denies ovarian cancer. Denies vulvar cancer or pre-cancer. Denies vaginal cancer or pre-cancer. Denies breast cancer. Denies colon cancer.    ROS:  GENERAL: No weight changes. No swelling. No fatigue. No fever.  CARDIOVASCULAR: No chest pain. No shortness of breath. No leg cramps.   NEUROLOGICAL: No headaches. No vision changes.  BREASTS: No pain. No lumps. No discharge.  ABDOMEN: No pain. No nausea. No vomiting. No diarrhea. No constipation.  REPRODUCTIVE: No abnormal bleeding.   VULVA: No pain. No lesions. No itching.  VAGINA: No relaxation. No itching. No odor. No discharge. No  "lesions.  URINARY: No incontinence. No nocturia. No frequency. No dysuria.    /80   Ht 5' 1" (1.549 m)   Wt 50.7 kg (111 lb 12.4 oz)   BMI 21.12 kg/m²     PE:  APPEARANCE: Well nourished, well developed, in no acute distress.  AFFECT: WNL, alert and oriented x 3.  SKIN: No hirsutism or acne.  NECK: Neck symmetric without masses or thyromegaly.  NODES: No inguinal, cervical, axillary or femoral lymph node enlargement.  CHEST: Good respiratory effort.   ABDOMEN: Soft. No tenderness or masses. No hepatosplenomegaly. No hernias.  BREASTS: Symmetrical, no skin changes or visible lesions. No palpable masses, nipple discharge bilaterally.  PELVIC: ATROPHIC EXTERNAL FEMALE GENITALIA without lesions. Normal hair distribution. Adequate perineal body, normal urethral meatus. VAGINA DRY without lesions or discharge. CERVIX STENOTIC without lesions, discharge or tenderness. No significant cystocele or rectocele. Bimanual exam shows uterus to be normal size, regular, mobile and nontender. Adnexa without masses or tenderness.  EXTREMITIES: No edema.    PROCEDURES:  Pap    DIAGNOSIS:  1. Visit for gynecologic examination  Liquid-based pap smear, screening   2. Menopausal and perimenopausal disorder         PLAN:    LABS AND TESTS ORDERED:  Mammogram    COUNSELING:  The patient was counseled today on osteoporosis prevention, calcium supplementation, and regular weight bearing exercise. The patient was also counseled today on ACS PAP guidelines, with recommendations for yearly pelvic exams unless their uterus, cervix, and ovaries were removed for benign reasons; in that case, examinations every 3-5 years are recommended.  The patient was also counseled regarding monthly breast self-examination, routine STD screening for at-risk populations, prophylactic immunizations for transmitted infections such as  HPV, Pertussis, or Influenza as appropriate, and yearly mammograms when indicated by ACS guidelines.  She was advised to see " her primary care physician for all other health maintenance.    FOLLOW-UP with me annually.

## 2018-03-18 ENCOUNTER — PATIENT MESSAGE (OUTPATIENT)
Dept: OBSTETRICS AND GYNECOLOGY | Facility: CLINIC | Age: 68
End: 2018-03-18

## 2018-09-06 ENCOUNTER — HOSPITAL ENCOUNTER (OUTPATIENT)
Dept: RADIOLOGY | Facility: HOSPITAL | Age: 68
Discharge: HOME | End: 2018-09-06
Attending: PHYSICIAN ASSISTANT
Payer: MEDICARE

## 2018-09-06 ENCOUNTER — OFFICE VISIT (OUTPATIENT)
Dept: FAMILY MEDICINE | Facility: CLINIC | Age: 68
End: 2018-09-06
Payer: MEDICARE

## 2018-09-06 VITALS
OXYGEN SATURATION: 98 % | HEIGHT: 61 IN | SYSTOLIC BLOOD PRESSURE: 110 MMHG | BODY MASS INDEX: 21.18 KG/M2 | DIASTOLIC BLOOD PRESSURE: 64 MMHG | HEART RATE: 67 BPM | WEIGHT: 112.19 LBS

## 2018-09-06 DIAGNOSIS — H65.04 RECURRENT ACUTE SEROUS OTITIS MEDIA OF RIGHT EAR: Primary | ICD-10-CM

## 2018-09-06 DIAGNOSIS — M77.9 TENDONITIS: ICD-10-CM

## 2018-09-06 PROCEDURE — 73110 X-RAY EXAM OF WRIST: CPT | Mod: 26,LT,, | Performed by: RADIOLOGY

## 2018-09-06 PROCEDURE — 99214 OFFICE O/P EST MOD 30 MIN: CPT | Mod: S$PBB,,, | Performed by: PHYSICIAN ASSISTANT

## 2018-09-06 PROCEDURE — 99999 PR PBB SHADOW E&M-EST. PATIENT-LVL III: CPT | Mod: PBBFAC,,, | Performed by: PHYSICIAN ASSISTANT

## 2018-09-06 PROCEDURE — 73110 X-RAY EXAM OF WRIST: CPT | Mod: TC,FY,PO,LT

## 2018-09-06 PROCEDURE — 99213 OFFICE O/P EST LOW 20 MIN: CPT | Mod: PBBFAC,25,PO | Performed by: PHYSICIAN ASSISTANT

## 2018-09-06 RX ORDER — PREDNISONE 20 MG/1
40 TABLET ORAL DAILY
Qty: 6 TABLET | Refills: 0 | Status: SHIPPED | OUTPATIENT
Start: 2018-09-06 | End: 2018-09-09

## 2018-09-06 NOTE — PROGRESS NOTES
Subjective:       Patient ID: Zaira Bansal is a 67 y.o. female    Chief Complaint: Otalgia (Right ear pain started yesterday)    HPI  Mrs Bansal presents today CO right ear pain that began yesterday and seemed to resolve after she took a left over Cipro tablet.  She denies any fever, no cough, no sinus pressure or pain.  Also she reports left wrist pain intermittently for the past 3-4 months.  No triggering event.      Review of Systems   Constitutional: Negative for activity change, chills and fever.   HENT: Positive for ear pain (rigth ear). Negative for congestion and sore throat.    Eyes: Negative for visual disturbance.   Respiratory: Negative for cough and shortness of breath.    Cardiovascular: Negative for chest pain and palpitations.   Gastrointestinal: Negative for abdominal pain, diarrhea, nausea and vomiting.   Endocrine: Negative for polydipsia and polyuria.   Musculoskeletal: Positive for arthralgias (left wrist). Negative for myalgias.   Skin: Negative for rash.   Neurological: Negative for headaches.   Psychiatric/Behavioral: The patient is not nervous/anxious.         Objective:   Physical Exam   Constitutional: She is oriented to person, place, and time. She appears well-developed and well-nourished. No distress.   HENT:   Head: Normocephalic and atraumatic.   Clear effusion of TMs bilaterally   Eyes: EOM are normal. Pupils are equal, round, and reactive to light.   Neck: Normal range of motion. Neck supple.   Cardiovascular: Normal rate, regular rhythm, normal heart sounds and intact distal pulses. Exam reveals no gallop and no friction rub.   No murmur heard.  Pulmonary/Chest: Effort normal and breath sounds normal. No respiratory distress. She has no wheezes. She has no rales. She exhibits no tenderness.   Abdominal: Soft. Bowel sounds are normal. She exhibits no distension and no mass. There is no tenderness. There is no guarding.   Musculoskeletal: Normal range of motion. She exhibits  tenderness (ttp of the left wrsit over the extensor tendon of the thumb).   Neurological: She is alert and oriented to person, place, and time.   Skin: Skin is warm and dry. No rash noted. She is not diaphoretic.   Psychiatric: She has a normal mood and affect. Her behavior is normal. Judgment and thought content normal.        Assessment:       1. Recurrent acute serous otitis media of right ear  predniSONE (DELTASONE) 20 MG tablet   2. Tendonitis  X-Ray Wrist Complete Left        Plan:       Recurrent acute serous otitis media of right ear  -     predniSONE (DELTASONE) 20 MG tablet; Take 2 tablets (40 mg total) by mouth once daily. for 3 days  Dispense: 6 tablet; Refill: 0    Tendonitis  -     X-Ray Wrist Complete Left; Future; Expected date: 09/06/2018  - aleve BID

## 2019-01-15 ENCOUNTER — TELEPHONE (OUTPATIENT)
Dept: OBSTETRICS AND GYNECOLOGY | Facility: CLINIC | Age: 69
End: 2019-01-15

## 2019-01-15 DIAGNOSIS — Z12.31 VISIT FOR SCREENING MAMMOGRAM: Primary | ICD-10-CM

## 2019-01-15 NOTE — TELEPHONE ENCOUNTER
----- Message from Tatum Dotson sent at 1/15/2019  2:35 PM CST -----  Contact: Patient   Patient would like to have an order for Mammo scheduling. The patient can be reached at [phone].

## 2019-01-15 NOTE — TELEPHONE ENCOUNTER
Called patient back, no answer left message orders for mammop entered, patient can schedule at anytime..

## 2019-02-18 ENCOUNTER — TELEPHONE (OUTPATIENT)
Dept: OBSTETRICS AND GYNECOLOGY | Facility: CLINIC | Age: 69
End: 2019-02-18

## 2019-02-18 DIAGNOSIS — N95.1 MENOPAUSAL SYMPTOM: ICD-10-CM

## 2019-02-18 DIAGNOSIS — M81.0 MENOPAUSAL OSTEOPOROSIS: Primary | ICD-10-CM

## 2019-02-18 NOTE — TELEPHONE ENCOUNTER
Bone Density appt has been scheduled .   ----- Message from Liya Yee MA sent at 2/15/2019 11:17 AM CST -----  Contact: Bone Density   Ester,     Please send me a message once patient has came in for her appt. She needs a bone scan in order for me to setup her appt with the bone density clinic.     Thanks,   Liya Yee  Panel Coordinator   Proactive [facility] Encounters   ----- Message -----  From: Liya Yee MA  Sent: 1/28/2019   5:06 PM  To: Liya Yee        ----- Message -----  From: Tatum Dotson  Sent: 1/15/2019   2:36 PM  To: Liya Yee    Patient would like to schedule Bone density appointment but unsure if it's time. Her last visit was 10/2017. The patient can be reached at [phone].

## 2019-03-13 ENCOUNTER — OFFICE VISIT (OUTPATIENT)
Dept: OBSTETRICS AND GYNECOLOGY | Facility: CLINIC | Age: 69
End: 2019-03-13
Payer: MEDICARE

## 2019-03-13 ENCOUNTER — HOSPITAL ENCOUNTER (OUTPATIENT)
Dept: RADIOLOGY | Facility: OTHER | Age: 69
Discharge: HOME | End: 2019-03-13
Attending: OBSTETRICS & GYNECOLOGY
Payer: MEDICARE

## 2019-03-13 VITALS
WEIGHT: 108 LBS | HEIGHT: 61 IN | SYSTOLIC BLOOD PRESSURE: 122 MMHG | DIASTOLIC BLOOD PRESSURE: 74 MMHG | BODY MASS INDEX: 20.39 KG/M2

## 2019-03-13 DIAGNOSIS — Z12.31 VISIT FOR SCREENING MAMMOGRAM: ICD-10-CM

## 2019-03-13 DIAGNOSIS — Z01.419 VISIT FOR GYNECOLOGIC EXAMINATION: Primary | ICD-10-CM

## 2019-03-13 DIAGNOSIS — N95.9 MENOPAUSAL AND PERIMENOPAUSAL DISORDER: ICD-10-CM

## 2019-03-13 DIAGNOSIS — M81.0 MENOPAUSAL OSTEOPOROSIS: ICD-10-CM

## 2019-03-13 DIAGNOSIS — M85.80 OSTEOPENIA, UNSPECIFIED LOCATION: ICD-10-CM

## 2019-03-13 PROCEDURE — 77080 DEXA BONE DENSITY SPINE HIP: ICD-10-PCS | Mod: 26,,, | Performed by: RADIOLOGY

## 2019-03-13 PROCEDURE — G0101 CA SCREEN;PELVIC/BREAST EXAM: HCPCS | Mod: PBBFAC | Performed by: OBSTETRICS & GYNECOLOGY

## 2019-03-13 PROCEDURE — 77067 SCR MAMMO BI INCL CAD: CPT | Mod: TC

## 2019-03-13 PROCEDURE — G0101 PR CA SCREEN;PELVIC/BREAST EXAM: ICD-10-PCS | Mod: S$PBB,,, | Performed by: OBSTETRICS & GYNECOLOGY

## 2019-03-13 PROCEDURE — 77063 BREAST TOMOSYNTHESIS BI: CPT | Mod: 26,,, | Performed by: INTERNAL MEDICINE

## 2019-03-13 PROCEDURE — 77063 MAMMO DIGITAL SCREENING BILAT WITH TOMOSYNTHESIS_CAD: ICD-10-PCS | Mod: 26,,, | Performed by: INTERNAL MEDICINE

## 2019-03-13 PROCEDURE — 77067 SCR MAMMO BI INCL CAD: CPT | Mod: 26,,, | Performed by: INTERNAL MEDICINE

## 2019-03-13 PROCEDURE — G0101 CA SCREEN;PELVIC/BREAST EXAM: HCPCS | Mod: S$PBB,,, | Performed by: OBSTETRICS & GYNECOLOGY

## 2019-03-13 PROCEDURE — 99999 PR PBB SHADOW E&M-EST. PATIENT-LVL III: CPT | Mod: PBBFAC,,, | Performed by: OBSTETRICS & GYNECOLOGY

## 2019-03-13 PROCEDURE — 77080 DXA BONE DENSITY AXIAL: CPT | Mod: 26,,, | Performed by: RADIOLOGY

## 2019-03-13 PROCEDURE — 99213 OFFICE O/P EST LOW 20 MIN: CPT | Mod: PBBFAC,25 | Performed by: OBSTETRICS & GYNECOLOGY

## 2019-03-13 PROCEDURE — 77067 MAMMO DIGITAL SCREENING BILAT WITH TOMOSYNTHESIS_CAD: ICD-10-PCS | Mod: 26,,, | Performed by: INTERNAL MEDICINE

## 2019-03-13 PROCEDURE — 77080 DXA BONE DENSITY AXIAL: CPT | Mod: TC

## 2019-03-13 PROCEDURE — 99999 PR PBB SHADOW E&M-EST. PATIENT-LVL III: ICD-10-PCS | Mod: PBBFAC,,, | Performed by: OBSTETRICS & GYNECOLOGY

## 2019-03-13 NOTE — PROGRESS NOTES
HISTORY OF PRESENT ILLNESS:    Zaira Bansal is a 68 y.o. female , presents for a routine exam and has no complaints.  NO GYN C/O, HAD MAMMO EARLIER TODAY AND ALSO DEXA - RESULTS OF SCREENING DO NOT INDICATE NEED TO RESTART BISPHOSPHONATES  TO [place] [facility]  AND WILL RETURN TO [place] THIS SUMMER    LAST VISIT 3/2018:  PAP TODAY AND HAD MAMMO EARLIER TODAY.  NO GYN C/O EXCEPT SOME DRYNESS AND DISCUSSED LIQUIBEADS.  NO HF, SWATI.  EXPECTING 4TH GRANDCHILD SOON, LIVING IN [place]  LAST VISIT 2017:  C/O PELVIC PAIN SINCE July - WAS SEEN [facility] AND REF TO PHYS THERAPY - WANTS TO BE SURE NOT GYN CAUSE.  3 MO RLQ PAIN, RADIATES FROM BACK, WORSE WHEN INACTIVE AND BETTER WITH ACTIVITY.  WORRIES RE OV NEOPLASM - WILL CHECK PELVIC USG BUT ADVISED MORE LIKELY MUSCULOSKEL OR NERVE IMPINGEMENT AND REASSURED NL EXAM.  ADVISED PT TO F/U WITH PT AND POSS BENEFIT FROM EVAL WITH ORTHO, BACK SPECIALIST  LAST ROUTINE VISIT 3/2017:   PAP NEXT YEAR, AND MAMMO HAS BEEN SCHEDULED.  HAS HX OF OSTEOPENIA AND NOT CURRENTLY TAKING ACTONEL - F/U BMD AS HAS BEEN YEARS SINCE LAST CHECKED.  RECENT OUSMANE WITH DAUGHTER AND THE GRANDKIDS.  SINCE LAST VISIT MOVED TO [facility].  COMFORT MEASURES FOR URI  LAST VISIT 2016:  PAP DUE AND SUBMITTED; MAMMO HAS BEED SCHEDULED FOR TOMORROW - NO C/O AT ALL  LAST VISIT :  PAP UP TO DATE, DUE  AND MAMMO DUE NEXT MONTH, ORDERED. NO GYN ISSUES  LAST VISIT 2013:  PAP UP TO DATE AND MAMMO ORDERED; HAS ALREADY HAD ORDER FOR DEXA SCAN ENTERED TOO. NO HOT FLUSHES AND VAGINAL DRYNESS.  SON GETTING  IN   LAST ROUTINE VISIT 2012:  DOING WELL, AND NO C/O. HER DA[facility] . refills FOR MAMMO. PAP SUBMITTED, DISCUSSED 3YR SCREENING INTERVAL. NO T HOT FLUSHES THAT ARE SEVERE, NO SWATI SX   HAPPILY RETIRED FROM [employer]. JUST RECENTLY BOUGHT VACATION HOUSE IN [facility]     History reviewed. No pertinent past medical  history.    Past Surgical History:   Procedure Laterality Date    DILATION AND CURETTAGE OF UTERUS          MEDICATIONS AND ALLERGIES:      Current Outpatient Medications:     clobetasol (CLOBEX) 0.05 % topical spray, APPLY TO SKIN TWICE DAILY AS NEEDED FOR ITCHING. USE SPARINGLY, CAN CAUSE THINNING OF SKIN, Disp: , Rfl: 0    Review of patient's allergies indicates:   Allergen Reactions    Compazine [prochlorperazine edisylate]        Family History   Problem Relation Age of Onset    Glaucoma Mother     Lung cancer Father     Cancer Other     Uterine cancer Maternal Grandmother     Blindness Neg Hx     Cataracts Neg Hx     Diabetes Neg Hx     Breast cancer Neg Hx     Colon cancer Neg Hx     Ovarian cancer Neg Hx        Social History     Socioeconomic History    Marital status:      Spouse name: Not on file    Number of children: Not on file    Years of education: Not on file    Highest education level: Not on file   Social Needs    Financial resource strain: Not on file    Food insecurity - worry: Not on file    Food insecurity - inability: Not on file    Transportation needs - medical: Not on file    Transportation needs - non-medical: Not on file   Occupational History    Not on file   Tobacco Use    Smoking status: Never Smoker    Smokeless tobacco: Never Used   Substance and Sexual Activity    Alcohol use: Yes    Drug use: No    Sexual activity: Yes     Partners: Male     Birth control/protection: Post-menopausal   Other Topics Concern    Not on file   Social History Narrative    Not on file       COMPREHENSIVE GYN HISTORY:  PAP History:  Denies abnormal Paps except a noted above.  Infection History: Denies STDs. Denies PID.  Benign History: Denies uterine fibroids. Denies ovarian cysts. Denies endometriosis.  Denies other conditions.  Cancer History: Denies cervical cancer. Denies uterine cancer or hyperplasia. Denies ovarian cancer. Denies vulvar cancer or pre-cancer.  "Denies vaginal cancer or pre-cancer. Denies breast cancer. Denies colon cancer.    ROS:  GENERAL: No weight changes. No swelling. No fatigue. No fever.  CARDIOVASCULAR: No chest pain. No shortness of breath. No leg cramps.   NEUROLOGICAL: No headaches. No vision changes.  BREASTS: No pain. No lumps. No discharge.  ABDOMEN: No pain. No nausea. No vomiting. No diarrhea. No constipation.  REPRODUCTIVE: No abnormal bleeding.   VULVA: No pain. No lesions. No itching.  VAGINA: No relaxation. No itching. No odor. No discharge. No lesions.  URINARY: No incontinence. No nocturia. No frequency. No dysuria.    /74   Ht 5' 1" (1.549 m)   Wt 49 kg (108 lb 0.4 oz)   BMI 20.41 kg/m²     PE:  APPEARANCE: Well nourished, well developed, in no acute distress.  AFFECT: WNL, alert and oriented x 3.  SKIN: No hirsutism or acne.  NECK: Neck symmetric without masses or thyromegaly.  NODES: No inguinal, cervical, axillary or femoral lymph node enlargement.  CHEST: Good respiratory effort.   ABDOMEN: Soft. No tenderness or masses. No hepatosplenomegaly. No hernias.  BREASTS: Symmetrical, no skin changes or visible lesions. No palpable masses, nipple discharge bilaterally.  PELVIC: ATROPHIC EXTERNAL FEMALE GENITALIA without lesions. Normal hair distribution. Adequate perineal body, normal urethral meatus. VAGINA DRY without lesions or discharge. CERVIX STENOTIC without lesions, discharge or tenderness. No significant cystocele or rectocele. Bimanual exam shows uterus to be normal size, regular, mobile and nontender. Adnexa without masses or tenderness.  EXTREMITIES: No edema.    PROCEDURES:  Pap    DIAGNOSIS:  1. Visit for gynecologic examination     2. Menopausal and perimenopausal disorder     3. Osteopenia, unspecified location         PLAN:    LABS AND TESTS ORDERED:  Mammogram    COUNSELING:  The patient was counseled today on osteoporosis prevention, calcium supplementation, and regular weight bearing exercise. The patient was " also counseled today on ACS PAP guidelines, with recommendations for yearly pelvic exams unless their uterus, cervix, and ovaries were removed for benign reasons; in that case, examinations every 3-5 years are recommended.  The patient was also counseled regarding monthly breast self-examination, routine STD screening for at-risk populations, prophylactic immunizations for transmitted infections such as  HPV, Pertussis, or Influenza as appropriate, and yearly mammograms when indicated by ACS guidelines.  She was advised to see her primary care physician for all other health maintenance.    FOLLOW-UP with me annually.

## 2019-05-31 ENCOUNTER — OFFICE VISIT (OUTPATIENT)
Dept: DERMATOLOGY | Facility: CLINIC | Age: 69
End: 2019-05-31
Payer: MEDICARE

## 2019-05-31 VITALS — WEIGHT: 108 LBS | BODY MASS INDEX: 20.39 KG/M2 | HEIGHT: 61 IN

## 2019-05-31 DIAGNOSIS — D22.9 MULTIPLE BENIGN NEVI: ICD-10-CM

## 2019-05-31 DIAGNOSIS — L82.1 SEBORRHEIC KERATOSES: ICD-10-CM

## 2019-05-31 DIAGNOSIS — Z85.828 HISTORY OF NONMELANOMA SKIN CANCER: ICD-10-CM

## 2019-05-31 DIAGNOSIS — L82.0 INFLAMED SEBORRHEIC KERATOSIS: Primary | ICD-10-CM

## 2019-05-31 PROCEDURE — 99999 PR PBB SHADOW E&M-EST. PATIENT-LVL II: CPT | Mod: PBBFAC,,, | Performed by: DERMATOLOGY

## 2019-05-31 PROCEDURE — 17110 DESTRUCTION B9 LES UP TO 14: CPT | Mod: S$PBB,,, | Performed by: DERMATOLOGY

## 2019-05-31 PROCEDURE — 99999 PR PBB SHADOW E&M-EST. PATIENT-LVL II: ICD-10-PCS | Mod: PBBFAC,,, | Performed by: DERMATOLOGY

## 2019-05-31 PROCEDURE — 99202 OFFICE O/P NEW SF 15 MIN: CPT | Mod: 25,S$PBB,, | Performed by: DERMATOLOGY

## 2019-05-31 PROCEDURE — 17110 PR DESTRUCTION BENIGN LESIONS UP TO 14: ICD-10-PCS | Mod: S$PBB,,, | Performed by: DERMATOLOGY

## 2019-05-31 PROCEDURE — 99212 OFFICE O/P EST SF 10 MIN: CPT | Mod: PBBFAC,PO | Performed by: DERMATOLOGY

## 2019-05-31 PROCEDURE — 99202 PR OFFICE/OUTPT VISIT, NEW, LEVL II, 15-29 MIN: ICD-10-PCS | Mod: 25,S$PBB,, | Performed by: DERMATOLOGY

## 2019-05-31 NOTE — PROGRESS NOTES
Subjective:       Patient ID:  Zaira Bansal is a 68 y.o. female who presents for   Chief Complaint   Patient presents with    Lesion     67 y/o F initial visit for lesion on her right cheek x 1 month, The patient denies any change, including change in color, increase in size, or spontaneous bleeding, associated with this lesion. No tx    H/o of BCC on her right nasofacial sulcus (2017) - bilobe flap.   Daughter and father had melanoma.     History reviewed. No pertinent past medical history.        Review of Systems   Constitutional: Negative for fever, chills and fatigue.   Skin: Positive for daily sunscreen use, activity-related sunscreen use and wears hat. Negative for itching, rash and dry skin.   Hematologic/Lymphatic: Bruises/bleeds easily.        Objective:    Physical Exam   Constitutional: She appears well-developed and well-nourished. No distress.   Neurological: She is alert and oriented to person, place, and time. She is not disoriented.   Psychiatric: She has a normal mood and affect.   Skin:   Areas Examined (abnormalities noted in diagram):   Head / Face Inspection Performed  Neck Inspection Performed  Chest / Axilla Inspection Performed  Abdomen Inspection Performed  Back Inspection Performed  RUE Inspected  LUE Inspection Performed                   Diagram Legend     Erythematous scaling macule/papule c/w actinic keratosis       Vascular papule c/w angioma      Pigmented verrucoid papule/plaque c/w seborrheic keratosis      Yellow umbilicated papule c/w sebaceous hyperplasia      Irregularly shaped tan macule c/w lentigo     1-2 mm smooth white papules consistent with Milia      Movable subcutaneous cyst with punctum c/w epidermal inclusion cyst      Subcutaneous movable cyst c/w pilar cyst      Firm pink to brown papule c/w dermatofibroma      Pedunculated fleshy papule(s) c/w skin tag(s)      Evenly pigmented macule c/w junctional nevus     Mildly variegated pigmented, slightly  irregular-bordered macule c/w mildly atypical nevus      Flesh colored to evenly pigmented papule c/w intradermal nevus       Pink pearly papule/plaque c/w basal cell carcinoma      Erythematous hyperkeratotic cursted plaque c/w SCC      Surgical scar with no sign of skin cancer recurrence      Open and closed comedones      Inflammatory papules and pustules      Verrucoid papule consistent consistent with wart     Erythematous eczematous patches and plaques     Dystrophic onycholytic nail with subungual debris c/w onychomycosis     Umbilicated papule    Erythematous-base heme-crusted tan verrucoid plaque consistent with inflamed seborrheic keratosis     Erythematous Silvery Scaling Plaque c/w Psoriasis     See annotation      Assessment / Plan:        Inflamed seborrheic keratosis  Cryosurgery procedure note:    Verbal consent from the patient is obtained including, but not limited to, risk of hypopigmentation/hyperpigmentation, scar, recurrence of lesion. Liquid nitrogen cryosurgery is applied to 1 lesions to produce a freeze injury. The patient is aware that blisters may form and is instructed on wound care with gentle cleansing and use of vaseline ointment to keep moist until healed. The patient is supplied a handout on cryosurgery and is instructed to call if lesions do not completely resolve.      Seborrheic keratoses  These are benign inherited growths without a malignant potential. Reassurance given to patient. No treatment is necessary.     Multiple benign nevi  upper body skin examination performed today including at least 6 points as noted in physical examination. No lesions suspicious for malignancy noted.  Reassurance provided.  Instructed patient to observe lesion(s) for changes and follow up in clinic if changes are noted. Discussed ABCDE's of moles and brochure provided.      History of nonmelanoma skin cancer  Area(s) of previous NMSC evaluated with no signs of recurrence.    Upper body skin  examination performed today including at least 6 points as noted in physical examination. No lesions suspicious for malignancy noted.  - The signs and symptoms of skin cancer were reviewed and the patient was advised to practice sun protection and sun avoidance, use daily sunscreen, and perform regular self skin exams.                Follow up in about 6 months (around 11/30/2019) for for TBSE.

## 2019-09-24 NOTE — TELEPHONE ENCOUNTER
Called pt and scheduled her mammogram.  Pt verbalized understanding and thanked me for call.   Spoke with patient on test results, verbalized understanding.

## 2020-02-12 ENCOUNTER — TELEPHONE (OUTPATIENT)
Dept: OBSTETRICS AND GYNECOLOGY | Facility: CLINIC | Age: 70
End: 2020-02-12

## 2020-02-12 DIAGNOSIS — Z12.31 SCREENING MAMMOGRAM, ENCOUNTER FOR: Primary | ICD-10-CM

## 2020-02-12 NOTE — TELEPHONE ENCOUNTER
Order in ,mammo scheduled for 3/23/20 @ 3pm, called pt to notify no answer lm to call office back

## 2020-02-12 NOTE — TELEPHONE ENCOUNTER
----- Message from Vanessa Garcia sent at 2/12/2020  3:05 PM CST -----  Contact: LINDA HOOD [[id number]]   Name of Who is Calling:     What is the request in detail: patient request call back in reference to mammogram order patient request order for same day as scheduled appointment 03/23/20 Please contact to further discuss and advise      Can the clinic reply by [facility]: no     What Number to Call Back if not in [facility]:  [phone]

## 2020-08-31 ENCOUNTER — OFFICE VISIT (OUTPATIENT)
Dept: FAMILY MEDICINE | Facility: CLINIC | Age: 70
End: 2020-08-31
Payer: MEDICARE

## 2020-08-31 VITALS
OXYGEN SATURATION: 99 % | HEART RATE: 72 BPM | WEIGHT: 106.94 LBS | BODY MASS INDEX: 20.2 KG/M2 | DIASTOLIC BLOOD PRESSURE: 76 MMHG | SYSTOLIC BLOOD PRESSURE: 118 MMHG

## 2020-08-31 DIAGNOSIS — R79.9 ABNORMAL FINDING OF BLOOD CHEMISTRY, UNSPECIFIED: ICD-10-CM

## 2020-08-31 DIAGNOSIS — Z00.00 WELLNESS EXAMINATION: ICD-10-CM

## 2020-08-31 DIAGNOSIS — B02.9 HERPES ZOSTER WITHOUT COMPLICATION: Primary | ICD-10-CM

## 2020-08-31 PROCEDURE — 99999 PR PBB SHADOW E&M-EST. PATIENT-LVL III: CPT | Mod: PBBFAC,,, | Performed by: INTERNAL MEDICINE

## 2020-08-31 PROCEDURE — 99204 PR OFFICE/OUTPT VISIT, NEW, LEVL IV, 45-59 MIN: ICD-10-PCS | Mod: S$PBB,,, | Performed by: INTERNAL MEDICINE

## 2020-08-31 PROCEDURE — 99204 OFFICE O/P NEW MOD 45 MIN: CPT | Mod: S$PBB,,, | Performed by: INTERNAL MEDICINE

## 2020-08-31 PROCEDURE — 99213 OFFICE O/P EST LOW 20 MIN: CPT | Mod: PBBFAC,PO | Performed by: INTERNAL MEDICINE

## 2020-08-31 PROCEDURE — 99999 PR PBB SHADOW E&M-EST. PATIENT-LVL III: ICD-10-PCS | Mod: PBBFAC,,, | Performed by: INTERNAL MEDICINE

## 2020-08-31 RX ORDER — VALACYCLOVIR HYDROCHLORIDE 1 G/1
1000 TABLET, FILM COATED ORAL 2 TIMES DAILY
Qty: 60 TABLET | Refills: 11 | Status: SHIPPED | OUTPATIENT
Start: 2020-08-31 | End: 2020-10-26

## 2020-08-31 RX ORDER — HYDROCODONE BITARTRATE AND ACETAMINOPHEN 5; 325 MG/1; MG/1
1 TABLET ORAL
Qty: 10 TABLET | Refills: 0 | Status: SHIPPED | OUTPATIENT
Start: 2020-08-31 | End: 2020-10-26

## 2020-08-31 RX ORDER — METHYLPREDNISOLONE 4 MG/1
TABLET ORAL
Qty: 1 PACKAGE | Refills: 0 | Status: SHIPPED | OUTPATIENT
Start: 2020-08-31 | End: 2020-09-21

## 2020-08-31 NOTE — PROGRESS NOTES
Patient ID: Zaira Bansal     Chief Complaint:   Chief Complaint   Patient presents with    Herpes Zoster     Rash on back        HPI: New Patient to me. Complains of shingles rash and pain x 5 days (but she did have prodrome of pain a few days prior). Physical Exam today shows the typical singles rash over Left Lower Quadrant and back. Will treat w/ Valtrex and Medrol Dose Pack and Norco and consider Gabapentin. I advised her to not drink wine while taking Norco.     Review of Systems   Constitutional: Negative.    HENT: Negative.    Eyes: Negative.    Respiratory: Negative.    Cardiovascular: Negative.    Gastrointestinal: Positive for nausea.   Endocrine: Negative.    Genitourinary: Negative.    Musculoskeletal: Negative.    Skin: Positive for rash.   Allergic/Immunologic: Negative.    Neurological: Negative.    Hematological: Negative.    Psychiatric/Behavioral: Negative.           Objective:      Physical Exam   Physical Exam  Vitals signs and nursing note reviewed.   Constitutional:       Appearance: Normal appearance. She is well-developed.   HENT:      Head: Normocephalic and atraumatic.      Nose: Nose normal.   Eyes:      Extraocular Movements: Extraocular movements intact.      Conjunctiva/sclera: Conjunctivae normal.      Pupils: Pupils are equal, round, and reactive to light.   Neck:      Musculoskeletal: Normal range of motion and neck supple.   Cardiovascular:      Rate and Rhythm: Normal rate and regular rhythm.      Pulses: Normal pulses.      Heart sounds: Normal heart sounds.   Pulmonary:      Effort: Pulmonary effort is normal.      Breath sounds: Normal breath sounds.   Abdominal:      General: Bowel sounds are normal.      Palpations: Abdomen is soft.   Musculoskeletal: Normal range of motion.   Skin:     General: Skin is warm and dry.      Capillary Refill: Capillary refill takes less than 2 seconds.      Findings: Rash present.      Comments: Typical rash of shingles on Left Lower  Quadrant radiating to back    Neurological:      General: No focal deficit present.      Mental Status: She is alert and oriented to person, place, and time.   Psychiatric:         Mood and Affect: Mood normal.         Behavior: Behavior normal.         Thought Content: Thought content normal.         Judgment: Judgment normal.       Current Outpatient Medications:     clobetasol (CLOBEX) 0.05 % topical spray, APPLY TO SKIN TWICE DAILY AS NEEDED FOR ITCHING. USE SPARINGLY, CAN CAUSE THINNING OF SKIN, Disp: , Rfl: 0    HYDROcodone-acetaminophen (NORCO) 5-325 mg per tablet, Take 1 tablet by mouth every 24 hours as needed for Pain., Disp: 10 tablet, Rfl: 0    methylPREDNISolone (MEDROL DOSEPACK) 4 mg tablet, use as directed, Disp: 1 Package, Rfl: 0    valACYclovir (VALTREX) 1000 MG tablet, Take 1 tablet (1,000 mg total) by mouth 2 (two) times daily., Disp: 60 tablet, Rfl: 11         Vitals:   Vitals:    08/31/20 1040   BP: 118/76   Patient Position: Sitting   Pulse: 72   SpO2: 99%   Weight: 48.5 kg (106 lb 14.8 oz)      Assessment:       Patient Active Problem List    Diagnosis Date Noted    Osteopenia 03/13/2019    Spasm of muscle, back 10/17/2017          Plan:       Zaira Bansal  was seen today for follow-up and may need lab work.    Diagnoses and all orders for this visit:    Zaira was seen today for herpes zoster.    Diagnoses and all orders for this visit:    Herpes zoster without complication  -     valACYclovir (VALTREX) 1000 MG tablet; Take 1 tablet (1,000 mg total) by mouth 2 (two) times daily.  -     methylPREDNISolone (MEDROL DOSEPACK) 4 mg tablet; use as directed  -     HYDROcodone-acetaminophen (NORCO) 5-325 mg per tablet; Take 1 tablet by mouth every 24 hours as needed for Pain.    Wellness examination  -     CBC auto differential; Future  -     Comprehensive metabolic panel; Future  -     Lipid Panel; Future  -     Hepatitis C Antibody; Future    Body mass index (BMI) 19.9 or less, adult   -      CBC auto differential; Future    Abnormal finding of blood chemistry, unspecified   -     Lipid Panel; Future

## 2020-10-26 ENCOUNTER — OFFICE VISIT (OUTPATIENT)
Dept: FAMILY MEDICINE | Facility: CLINIC | Age: 70
End: 2020-10-26
Payer: MEDICARE

## 2020-10-26 ENCOUNTER — HOSPITAL ENCOUNTER (OUTPATIENT)
Dept: RADIOLOGY | Facility: HOSPITAL | Age: 70
Discharge: HOME | End: 2020-10-26
Attending: PHYSICIAN ASSISTANT
Payer: MEDICARE

## 2020-10-26 ENCOUNTER — TELEPHONE (OUTPATIENT)
Dept: FAMILY MEDICINE | Facility: CLINIC | Age: 70
End: 2020-10-26

## 2020-10-26 VITALS
HEART RATE: 71 BPM | OXYGEN SATURATION: 96 % | SYSTOLIC BLOOD PRESSURE: 114 MMHG | DIASTOLIC BLOOD PRESSURE: 72 MMHG | WEIGHT: 108 LBS | BODY MASS INDEX: 20.41 KG/M2

## 2020-10-26 DIAGNOSIS — R10.11 RUQ PAIN: ICD-10-CM

## 2020-10-26 DIAGNOSIS — R07.81 RIB PAIN: ICD-10-CM

## 2020-10-26 DIAGNOSIS — B02.9 HERPES ZOSTER WITHOUT COMPLICATION: Primary | ICD-10-CM

## 2020-10-26 LAB
BILIRUB UR QL STRIP: NEGATIVE
CLARITY UR: ABNORMAL
COLOR UR: YELLOW
GLUCOSE UR QL STRIP: NEGATIVE
HGB UR QL STRIP: NEGATIVE
KETONES UR QL STRIP: NEGATIVE
LEUKOCYTE ESTERASE UR QL STRIP: NEGATIVE
NITRITE UR QL STRIP: NEGATIVE
PH UR STRIP: 7 [PH] (ref 5–8)
PROT UR QL STRIP: NEGATIVE
SP GR UR STRIP: 1.01 (ref 1–1.03)
URN SPEC COLLECT METH UR: ABNORMAL

## 2020-10-26 PROCEDURE — 71100 X-RAY EXAM RIBS UNI 2 VIEWS: CPT | Mod: TC,FY,PO,RT

## 2020-10-26 PROCEDURE — 71100 XR RIBS 2 VIEW RIGHT: ICD-10-PCS | Mod: 26,RT,, | Performed by: RADIOLOGY

## 2020-10-26 PROCEDURE — 81003 URINALYSIS AUTO W/O SCOPE: CPT | Mod: PO

## 2020-10-26 PROCEDURE — 99999 PR PBB SHADOW E&M-EST. PATIENT-LVL III: ICD-10-PCS | Mod: PBBFAC,,, | Performed by: PHYSICIAN ASSISTANT

## 2020-10-26 PROCEDURE — 99999 PR PBB SHADOW E&M-EST. PATIENT-LVL III: CPT | Mod: PBBFAC,,, | Performed by: PHYSICIAN ASSISTANT

## 2020-10-26 PROCEDURE — 99213 OFFICE O/P EST LOW 20 MIN: CPT | Mod: PBBFAC,25,PO | Performed by: PHYSICIAN ASSISTANT

## 2020-10-26 PROCEDURE — 99214 OFFICE O/P EST MOD 30 MIN: CPT | Mod: S$PBB,,, | Performed by: PHYSICIAN ASSISTANT

## 2020-10-26 PROCEDURE — 71100 X-RAY EXAM RIBS UNI 2 VIEWS: CPT | Mod: 26,RT,, | Performed by: RADIOLOGY

## 2020-10-26 PROCEDURE — 99214 PR OFFICE/OUTPT VISIT, EST, LEVL IV, 30-39 MIN: ICD-10-PCS | Mod: S$PBB,,, | Performed by: PHYSICIAN ASSISTANT

## 2020-10-26 RX ORDER — MELOXICAM 7.5 MG/1
7.5 TABLET ORAL DAILY
Qty: 7 TABLET | Refills: 0 | Status: SHIPPED | OUTPATIENT
Start: 2020-10-26 | End: 2020-11-02

## 2020-10-26 RX ORDER — VALACYCLOVIR HYDROCHLORIDE 1 G/1
1000 TABLET, FILM COATED ORAL 3 TIMES DAILY
Qty: 21 TABLET | Refills: 11 | Status: SHIPPED | OUTPATIENT
Start: 2020-10-26 | End: 2021-01-05 | Stop reason: CLARIF

## 2020-10-26 NOTE — PROGRESS NOTES
"Subjective:       Patient ID: Zaira Bansal is a 69 y.o. female.    Chief Complaint: Herpes Zoster (pain on right ribs and back, week, no rash, severe pain today)    HPI     Pt is new to me, PCP Dr. Marie.     Pt is a 69 year old female with no chronic medical history. She presents today complaining of pain near her right rib cage that has been coming on gradually for 1 week. Seems to wrap around right rib cage. Pain is throbbing/dull, not sharp. No burning or itching at this time, no rash. She did  have shingles 8/31 on her left abdomen with rash and itching/burning discomfort. She does state the the intial days of the outbreak felt similar to what she is experiencing now. Denies nausea, vomiting, belching, diarrhea, constipation, fatty food intake. She does workout often and lift small weights. She cannot think of any new exercise, fall, heavy lifting, or trauma that could have caused this pain. She feels she can "pinpoint the rib" where it hurts the most.     Review of Systems   Constitutional: Negative for chills and fever.   HENT: Negative for nasal congestion, nosebleeds, postnasal drip, rhinorrhea, sinus pressure/congestion and sore throat.    Respiratory: Negative for choking, chest tightness, shortness of breath and wheezing.    Cardiovascular: Negative for chest pain and palpitations.   Gastrointestinal: Negative for abdominal pain, change in bowel habit, constipation, diarrhea, nausea, vomiting, reflux and change in bowel habit.   Genitourinary: Negative for difficulty urinating, dysuria, flank pain, frequency, hematuria and urgency.   Musculoskeletal: Positive for back pain.   Integumentary:  Negative for rash.   Neurological: Negative for dizziness, vertigo, tremors, syncope, weakness, light-headedness and headaches.   Psychiatric/Behavioral: Negative for sleep disturbance. The patient is not nervous/anxious.          Objective:      Physical Exam  Vitals signs and nursing note reviewed. "   Constitutional:       General: She is not in acute distress.     Appearance: Normal appearance. She is not ill-appearing, toxic-appearing or diaphoretic.   HENT:      Head: Normocephalic and atraumatic.      Right Ear: External ear normal.      Left Ear: External ear normal.   Eyes:      General: No scleral icterus.        Right eye: No discharge.         Left eye: No discharge.      Extraocular Movements: Extraocular movements intact.      Conjunctiva/sclera: Conjunctivae normal.      Pupils: Pupils are equal, round, and reactive to light.   Neck:      Musculoskeletal: Normal range of motion and neck supple.   Cardiovascular:      Rate and Rhythm: Normal rate and regular rhythm.      Pulses: Normal pulses.      Heart sounds: Normal heart sounds. No murmur. No friction rub. No gallop.    Pulmonary:      Effort: Pulmonary effort is normal. No respiratory distress.      Breath sounds: Normal breath sounds. No stridor. No wheezing, rhonchi or rales.   Chest:      Comments: Right lower ribs on side of thorax tender to palpation  Abdominal:      General: Abdomen is flat. Bowel sounds are normal. There is no distension.      Palpations: Abdomen is soft. There is no hepatomegaly or mass.      Tenderness: There is abdominal tenderness in the right upper quadrant. There is right CVA tenderness (mild). There is no left CVA tenderness, guarding or rebound. Negative signs include Argueta's sign, Rovsing's sign and McBurney's sign.      Comments: Mild RUQ tenderness with deep palpation, no murphys sign   Musculoskeletal:         General: No deformity or signs of injury.      Thoracic back: She exhibits normal range of motion, no tenderness, no bony tenderness, no swelling, no edema, no deformity, no laceration, no pain, no spasm and normal pulse.        Back:       Right lower leg: No edema.      Left lower leg: No edema.   Lymphadenopathy:      Cervical: No cervical adenopathy.   Skin:     General: Skin is warm.       Coloration: Skin is not jaundiced or pale.      Findings: No lesion or rash.   Neurological:      General: No focal deficit present.      Mental Status: She is alert and oriented to person, place, and time. Mental status is at baseline.   Psychiatric:         Attention and Perception: Attention and perception normal.         Mood and Affect: Mood and affect normal.         Speech: Speech normal.         Behavior: Behavior normal. Behavior is cooperative.         Thought Content: Thought content normal.         Cognition and Memory: Cognition and memory normal.         Judgment: Judgment normal.         Assessment:       1. Herpes zoster without complication    2. RUQ pain    3. Rib pain        Plan:       1. Herpes zoster without complication  -pain may be due to herpes zoster, ok with trial of valtrex to determine if symptoms resolve  - valACYclovir (VALTREX) 1000 MG tablet; Take 1 tablet (1,000 mg total) by mouth 3 (three) times daily. At first sign of outbreak for 7 days  Dispense: 21 tablet; Refill: 11    2. RUQ pain  - US Abdomen Limited; Future  - Urinalysis, Reflex to Urine Culture Urine, Clean Catch  - US Abdomen Limited; Future    3. Rib pain  - X-Ray Ribs 2 View Right; Future  -pain also may have MSK component, recommend stretching, taking it easy with weight lifting, heat to affected area, and mobic  - meloxicam (MOBIC) 7.5 MG tablet; Take 1 tablet (7.5 mg total) by mouth once daily. Take with food. for 7 days  Dispense: 7 tablet; Refill: 0    F/U already scheduled with Dr. Marie 1//9       CARE GAPS:  Not addressed today.

## 2020-10-26 NOTE — PATIENT INSTRUCTIONS
A few reminders from today:    Schedule u/s.   Low fat diet until ultrasound.   Rib x ray today.   Start mobic.       Follow up with me if needed.   Please go to ER/urgent care if after hours or symptoms persist/worsen.     Do not hesitate to get in touch with me should you have any further questions.     Thank you for trusting me with your care!  I wish you health and happiness.    -Meli Bond PA-C

## 2020-10-27 ENCOUNTER — PATIENT MESSAGE (OUTPATIENT)
Dept: FAMILY MEDICINE | Facility: CLINIC | Age: 70
End: 2020-10-27

## 2020-10-27 NOTE — TELEPHONE ENCOUNTER
Called pt with u/s results. Normal gallbladder. Will try treatment for shingles/antiinflammatory for possible muscle strain. If no improvement, will consider HIDA scan. Pt will contact me in a week. RTC precautions/Er precautions given.

## 2020-11-05 ENCOUNTER — LAB VISIT (OUTPATIENT)
Dept: LAB | Facility: HOSPITAL | Age: 70
End: 2020-11-05
Attending: INTERNAL MEDICINE
Payer: MEDICARE

## 2020-11-05 DIAGNOSIS — Z00.00 WELLNESS EXAMINATION: ICD-10-CM

## 2020-11-05 DIAGNOSIS — R79.9 ABNORMAL FINDING OF BLOOD CHEMISTRY, UNSPECIFIED: ICD-10-CM

## 2020-11-05 LAB
ALBUMIN SERPL BCP-MCNC: 4.4 G/DL (ref 3.5–5.2)
ALP SERPL-CCNC: 42 U/L (ref 55–135)
ALT SERPL W/O P-5'-P-CCNC: 22 U/L (ref 10–44)
ANION GAP SERPL CALC-SCNC: 7 MMOL/L (ref 8–16)
AST SERPL-CCNC: 24 U/L (ref 10–40)
BASOPHILS # BLD AUTO: 0.07 K/UL (ref 0–0.2)
BASOPHILS NFR BLD: 1.1 % (ref 0–1.9)
BILIRUB SERPL-MCNC: 1 MG/DL (ref 0.1–1)
BUN SERPL-MCNC: 14 MG/DL (ref 8–23)
CALCIUM SERPL-MCNC: 9.6 MG/DL (ref 8.7–10.5)
CHLORIDE SERPL-SCNC: 105 MMOL/L (ref 95–110)
CHOLEST SERPL-MCNC: 248 MG/DL (ref 120–199)
CHOLEST/HDLC SERPL: 2.6 {RATIO} (ref 2–5)
CO2 SERPL-SCNC: 29 MMOL/L (ref 23–29)
CREAT SERPL-MCNC: 0.8 MG/DL (ref 0.5–1.4)
DIFFERENTIAL METHOD: ABNORMAL
EOSINOPHIL # BLD AUTO: 0.1 K/UL (ref 0–0.5)
EOSINOPHIL NFR BLD: 1.5 % (ref 0–8)
ERYTHROCYTE [DISTWIDTH] IN BLOOD BY AUTOMATED COUNT: 15.4 % (ref 11.5–14.5)
EST. GFR  (AFRICAN AMERICAN): >60 ML/MIN/1.73 M^2
EST. GFR  (NON AFRICAN AMERICAN): >60 ML/MIN/1.73 M^2
GLUCOSE SERPL-MCNC: 98 MG/DL (ref 70–110)
HCT VFR BLD AUTO: 43.3 % (ref 37–48.5)
HCV AB SERPL QL IA: NEGATIVE
HDLC SERPL-MCNC: 96 MG/DL (ref 40–75)
HDLC SERPL: 38.7 % (ref 20–50)
HGB BLD-MCNC: 14.1 G/DL (ref 12–16)
IMM GRANULOCYTES # BLD AUTO: 0.01 K/UL (ref 0–0.04)
IMM GRANULOCYTES NFR BLD AUTO: 0.2 % (ref 0–0.5)
LDLC SERPL CALC-MCNC: 131.8 MG/DL (ref 63–159)
LYMPHOCYTES # BLD AUTO: 3 K/UL (ref 1–4.8)
LYMPHOCYTES NFR BLD: 48.4 % (ref 18–48)
MCH RBC QN AUTO: 32.8 PG (ref 27–31)
MCHC RBC AUTO-ENTMCNC: 32.6 G/DL (ref 32–36)
MCV RBC AUTO: 101 FL (ref 82–98)
MONOCYTES # BLD AUTO: 0.4 K/UL (ref 0.3–1)
MONOCYTES NFR BLD: 7 % (ref 4–15)
NEUTROPHILS # BLD AUTO: 2.6 K/UL (ref 1.8–7.7)
NEUTROPHILS NFR BLD: 41.8 % (ref 38–73)
NONHDLC SERPL-MCNC: 152 MG/DL
NRBC BLD-RTO: 0 /100 WBC
PLATELET # BLD AUTO: 243 K/UL (ref 150–350)
PMV BLD AUTO: 9.5 FL (ref 9.2–12.9)
POTASSIUM SERPL-SCNC: 4.4 MMOL/L (ref 3.5–5.1)
PROT SERPL-MCNC: 7.4 G/DL (ref 6–8.4)
RBC # BLD AUTO: 4.3 M/UL (ref 4–5.4)
SODIUM SERPL-SCNC: 141 MMOL/L (ref 136–145)
TRIGL SERPL-MCNC: 101 MG/DL (ref 30–150)
WBC # BLD AUTO: 6.14 K/UL (ref 3.9–12.7)

## 2020-11-05 PROCEDURE — 86803 HEPATITIS C AB TEST: CPT

## 2020-11-05 PROCEDURE — 85025 COMPLETE CBC W/AUTO DIFF WBC: CPT

## 2020-11-05 PROCEDURE — 80061 LIPID PANEL: CPT

## 2020-11-05 PROCEDURE — 80053 COMPREHEN METABOLIC PANEL: CPT

## 2020-11-05 PROCEDURE — 36415 COLL VENOUS BLD VENIPUNCTURE: CPT | Mod: PO

## 2020-11-06 ENCOUNTER — TELEPHONE (OUTPATIENT)
Dept: OBSTETRICS AND GYNECOLOGY | Facility: CLINIC | Age: 70
End: 2020-11-06

## 2020-11-06 NOTE — TELEPHONE ENCOUNTER
----- Message from Aaron Balderas sent at 11/6/2020 10:04 AM CST -----  Patient said her appt was scheduled for 11/10/2020 at [facility] for 1:30pm, it's now scheduled for 11/11/2020 at [facility]. She doesn't want to be seen at [facility] and she can't make that date and time. She wants to know if she can have her old appt back. Patient phone number is [phone]. Thanks! And she also had a mammogram scheduled for 1pm at [facility]

## 2020-11-07 ENCOUNTER — PATIENT MESSAGE (OUTPATIENT)
Dept: OBSTETRICS AND GYNECOLOGY | Facility: CLINIC | Age: 70
End: 2020-11-07

## 2020-11-09 ENCOUNTER — OFFICE VISIT (OUTPATIENT)
Dept: FAMILY MEDICINE | Facility: CLINIC | Age: 70
End: 2020-11-09
Payer: MEDICARE

## 2020-11-09 ENCOUNTER — IMMUNIZATION (OUTPATIENT)
Dept: PHARMACY | Facility: CLINIC | Age: 70
End: 2020-11-09
Payer: MEDICARE

## 2020-11-09 ENCOUNTER — TELEPHONE (OUTPATIENT)
Dept: OBSTETRICS AND GYNECOLOGY | Facility: CLINIC | Age: 70
End: 2020-11-09

## 2020-11-09 VITALS
TEMPERATURE: 97 F | DIASTOLIC BLOOD PRESSURE: 76 MMHG | BODY MASS INDEX: 20.11 KG/M2 | SYSTOLIC BLOOD PRESSURE: 118 MMHG | HEIGHT: 61 IN | OXYGEN SATURATION: 99 % | WEIGHT: 106.5 LBS | HEART RATE: 75 BPM

## 2020-11-09 DIAGNOSIS — M19.049 ARTHRITIS PAIN OF HAND: ICD-10-CM

## 2020-11-09 DIAGNOSIS — E78.2 MIXED HYPERLIPIDEMIA: Primary | ICD-10-CM

## 2020-11-09 DIAGNOSIS — R07.81 RIB PAIN: ICD-10-CM

## 2020-11-09 PROCEDURE — 99213 OFFICE O/P EST LOW 20 MIN: CPT | Mod: PBBFAC,PO | Performed by: INTERNAL MEDICINE

## 2020-11-09 PROCEDURE — 99999 PR PBB SHADOW E&M-EST. PATIENT-LVL III: CPT | Mod: PBBFAC,,, | Performed by: INTERNAL MEDICINE

## 2020-11-09 PROCEDURE — 99214 OFFICE O/P EST MOD 30 MIN: CPT | Mod: S$PBB,,, | Performed by: INTERNAL MEDICINE

## 2020-11-09 PROCEDURE — 99999 PR PBB SHADOW E&M-EST. PATIENT-LVL III: ICD-10-PCS | Mod: PBBFAC,,, | Performed by: INTERNAL MEDICINE

## 2020-11-09 PROCEDURE — 99214 PR OFFICE/OUTPT VISIT, EST, LEVL IV, 30-39 MIN: ICD-10-PCS | Mod: S$PBB,,, | Performed by: INTERNAL MEDICINE

## 2020-11-09 RX ORDER — MELOXICAM 7.5 MG/1
7.5 TABLET ORAL DAILY
Qty: 30 TABLET | Refills: 2 | Status: SHIPPED | OUTPATIENT
Start: 2020-11-09 | End: 2021-11-10

## 2020-11-09 NOTE — PROGRESS NOTES
Patient ID: Zaira Bansal     Chief Complaint:   Chief Complaint   Patient presents with    Results        HPI: Follow up for labs that show an elevated HDL and LDL that we don't want to medicate at this time, but rather decrease the saturated fat in her diet. Other labs good. Right lower rib pain has resolved w/ Meloxicam, so it was likely due to some inflammations. Gallbladder Ultrasound and rib XR were normal. She's recovered from the Shingles and will get the Shingles and pneumonia shots. 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    HENT: Negative.    Eyes: Negative.    Respiratory: Negative.    Cardiovascular: Negative.    Gastrointestinal: Negative.    Endocrine: Negative.    Genitourinary: Negative.    Musculoskeletal: Negative.    Skin: Negative.    Allergic/Immunologic: Negative.    Neurological: Negative.    Hematological: Negative.    Psychiatric/Behavioral: Negative.           Objective:      Physical Exam   Physical Exam  Vitals signs and nursing note reviewed.   Constitutional:       Appearance: Normal appearance. She is well-developed.   HENT:      Head: Normocephalic and atraumatic.      Nose: Nose normal.   Eyes:      Extraocular Movements: Extraocular movements intact.      Conjunctiva/sclera: Conjunctivae normal.      Pupils: Pupils are equal, round, and reactive to light.   Neck:      Musculoskeletal: Normal range of motion and neck supple.   Cardiovascular:      Rate and Rhythm: Normal rate and regular rhythm.      Pulses: Normal pulses.      Heart sounds: Normal heart sounds.   Pulmonary:      Effort: Pulmonary effort is normal.      Breath sounds: Normal breath sounds.   Abdominal:      General: Bowel sounds are normal.      Palpations: Abdomen is soft.   Musculoskeletal: Normal range of motion.      Comments: Left thumb saddle joint osteoarthritis    Skin:      "General: Skin is warm and dry.      Capillary Refill: Capillary refill takes less than 2 seconds.   Neurological:      General: No focal deficit present.      Mental Status: She is alert and oriented to person, place, and time.   Psychiatric:         Mood and Affect: Mood normal.         Behavior: Behavior normal.         Thought Content: Thought content normal.         Judgment: Judgment normal.            Vitals:   Vitals:    11/09/20 1303   BP: 118/76   Pulse: 75   Temp: 97.3 °F (36.3 °C)   TempSrc: Temporal   SpO2: 99%   Weight: 48.3 kg (106 lb 7.7 oz)   Height: 5' 1" (1.549 m)          Current Outpatient Medications:     clobetasol (CLOBEX) 0.05 % topical spray, APPLY TO SKIN TWICE DAILY AS NEEDED FOR ITCHING. USE SPARINGLY, CAN CAUSE THINNING OF SKIN, Disp: , Rfl: 0    valACYclovir (VALTREX) 1000 MG tablet, Take 1 tablet (1,000 mg total) by mouth 3 (three) times daily. At first sign of outbreak for 7 days, Disp: 21 tablet, Rfl: 11    meloxicam (MOBIC) 7.5 MG tablet, Take 1 tablet (7.5 mg total) by mouth once daily., Disp: 30 tablet, Rfl: 2   Assessment:       Patient Active Problem List    Diagnosis Date Noted    Mixed hyperlipidemia 11/09/2020    Arthritis pain of hand 11/09/2020    Osteopenia 03/13/2019    Spasm of muscle, back 10/17/2017          Plan:       Zaira Bansal  was seen today for follow-up and may need lab work.    Diagnoses and all orders for this visit:    Zaira was seen today for results.    Diagnoses and all orders for this visit:    Mixed hyperlipidemia  Decrease saturated fat in diet and Monitor     Rib pain  -     meloxicam (MOBIC) 7.5 MG tablet; Take 1 tablet (7.5 mg total) by mouth once daily.    Arthritis pain of hand  Try OTC Voltaren gel as needed                     "

## 2020-11-09 NOTE — TELEPHONE ENCOUNTER
----- Message from Edgar Weber sent at 11/9/2020 10:16 AM CST -----  Regarding: Appointment  Contact: LINDA HOOD [[id number]]  Name of Who is Calling: LINDA HOOD [[id number]]      What is the request in detail: Would like to speak with staff in regards to reason her appointment was cancel and can only make it on tomorrow at the [facility]. Pt would like to have appointment back if possible. Please advise      Can the clinic reply by [facility]: no      What Number to Call Back if not in [facility]: [phone]

## 2020-11-19 ENCOUNTER — TELEPHONE (OUTPATIENT)
Dept: OBSTETRICS AND GYNECOLOGY | Facility: CLINIC | Age: 70
End: 2020-11-19

## 2020-11-19 NOTE — TELEPHONE ENCOUNTER
----- Message from Tony Ruvalcaba sent at 11/19/2020 12:20 PM CST -----  Type:  Mammogram    Caller is requesting to schedule their annual mammogram appointment.  Order is not listed in EPIC.  Please enter order and contact patient to schedule.    Name of Caller:  Patient  Where would they like the mammogram performed?  [facility]  Best Call Back Number:  [phone]  Additional Information: NA

## 2020-12-02 ENCOUNTER — PATIENT OUTREACH (OUTPATIENT)
Dept: ADMINISTRATIVE | Facility: OTHER | Age: 70
End: 2020-12-02

## 2020-12-02 NOTE — PROGRESS NOTES
Health Maintenance Due   Topic Date Due    Pneumococcal Vaccine (65+ Low/Medium Risk) (1 of 2 - PCV13) 10/31/2017    Mammogram  03/13/2020     Updates were requested from care everywhere.  Chart was reviewed for overdue Proactive [facility] Encounters ([facility]) topics (CRS, Breast Cancer Screening, Eye exam)  Health Maintenance has been updated.  LINKS immunization registry triggered.  Immunizations were reconciled.  Previously ordered mammogram tasked to patient.

## 2020-12-03 ENCOUNTER — HOSPITAL ENCOUNTER (OUTPATIENT)
Dept: RADIOLOGY | Facility: HOSPITAL | Age: 70
Discharge: HOME | End: 2020-12-03
Attending: OBSTETRICS & GYNECOLOGY
Payer: MEDICARE

## 2020-12-03 ENCOUNTER — OFFICE VISIT (OUTPATIENT)
Dept: OBSTETRICS AND GYNECOLOGY | Facility: CLINIC | Age: 70
End: 2020-12-03
Payer: MEDICARE

## 2020-12-03 VITALS
DIASTOLIC BLOOD PRESSURE: 70 MMHG | SYSTOLIC BLOOD PRESSURE: 104 MMHG | HEIGHT: 61 IN | BODY MASS INDEX: 19.9 KG/M2 | WEIGHT: 105.38 LBS

## 2020-12-03 DIAGNOSIS — Z01.419 ENCOUNTER FOR GYNECOLOGICAL EXAMINATION (GENERAL) (ROUTINE) WITHOUT ABNORMAL FINDINGS: Primary | ICD-10-CM

## 2020-12-03 DIAGNOSIS — Z12.31 SCREENING MAMMOGRAM, ENCOUNTER FOR: ICD-10-CM

## 2020-12-03 PROCEDURE — 77067 MAMMO DIGITAL SCREENING BILAT WITH TOMO: ICD-10-PCS | Mod: 26,,, | Performed by: RADIOLOGY

## 2020-12-03 PROCEDURE — 77067 SCR MAMMO BI INCL CAD: CPT | Mod: TC,PN

## 2020-12-03 PROCEDURE — 77063 BREAST TOMOSYNTHESIS BI: CPT | Mod: 26,,, | Performed by: RADIOLOGY

## 2020-12-03 PROCEDURE — G0101 CA SCREEN;PELVIC/BREAST EXAM: HCPCS | Mod: S$PBB,,, | Performed by: OBSTETRICS & GYNECOLOGY

## 2020-12-03 PROCEDURE — 77067 SCR MAMMO BI INCL CAD: CPT | Mod: 26,,, | Performed by: RADIOLOGY

## 2020-12-03 PROCEDURE — 99213 OFFICE O/P EST LOW 20 MIN: CPT | Mod: PBBFAC,PN,25 | Performed by: OBSTETRICS & GYNECOLOGY

## 2020-12-03 PROCEDURE — G0101 PR CA SCREEN;PELVIC/BREAST EXAM: ICD-10-PCS | Mod: S$PBB,,, | Performed by: OBSTETRICS & GYNECOLOGY

## 2020-12-03 PROCEDURE — G0101 CA SCREEN;PELVIC/BREAST EXAM: HCPCS | Mod: PBBFAC,PN | Performed by: OBSTETRICS & GYNECOLOGY

## 2020-12-03 PROCEDURE — 99999 PR PBB SHADOW E&M-EST. PATIENT-LVL III: ICD-10-PCS | Mod: PBBFAC,,, | Performed by: OBSTETRICS & GYNECOLOGY

## 2020-12-03 PROCEDURE — 77063 MAMMO DIGITAL SCREENING BILAT WITH TOMO: ICD-10-PCS | Mod: 26,,, | Performed by: RADIOLOGY

## 2020-12-03 PROCEDURE — 99999 PR PBB SHADOW E&M-EST. PATIENT-LVL III: CPT | Mod: PBBFAC,,, | Performed by: OBSTETRICS & GYNECOLOGY

## 2020-12-03 NOTE — PROGRESS NOTES
Chief Complaint   Patient presents with    Breast Pain     Tenderness at different times, would lilke to get mammo done today       History and Physical:  Zaira Bansal is a 70 y.o. F who presents today for her routine annual GYN exam. The patient has no Gynecology complaints.       No LMP recorded. Patient is postmenopausal.  Last Pap: a few year  History of abnormal pap: none  Last Mammogram: 1year  Colonosocpy: unsure. Blanco Means  DEXA: 1yr ago  PCP: Kishore Marie MD. Orders routine labs  Immunization status: stated as current, but no records available. S/p Zoster & scheduled for Pneumococcal >65  Body mass index is 19.91 kg/m².    Allergies:   Review of patient's allergies indicates:   Allergen Reactions    Compazine [prochlorperazine edisylate]      Lock jaw     Past Medical History:   Diagnosis Date    Arthritis pain of hand 2020    Dermatitis      Past Surgical History:   Procedure Laterality Date    DILATION AND CURETTAGE OF UTERUS      TONSILLECTOMY       MEDS:   Current Outpatient Medications on File Prior to Visit   Medication Sig Dispense Refill    clobetasol (CLOBEX) 0.05 % topical spray APPLY TO SKIN TWICE DAILY AS NEEDED FOR ITCHING. USE SPARINGLY, CAN CAUSE THINNING OF SKIN  0    meloxicam (MOBIC) 7.5 MG tablet Take 1 tablet (7.5 mg total) by mouth once daily. 30 tablet 2    valACYclovir (VALTREX) 1000 MG tablet Take 1 tablet (1,000 mg total) by mouth 3 (three) times daily. At first sign of outbreak for 7 days 21 tablet 11     No current facility-administered medications on file prior to visit.      OB History        3    Para   2    Term   2            AB   1    Living   2       SAB   1    TAB        Ectopic        Multiple        Live Births   2               Social History     Socioeconomic History    Marital status:      Spouse name: Not on file    Number of children: Not on file    Years of education: Not on file    Highest education level: Not on file    Occupational History    Not on file   Social Needs    Financial resource strain: Not hard at all    Food insecurity     Worry: Never true     Inability: Never true    Transportation needs     Medical: No     Non-medical: No   Tobacco Use    Smoking status: Never Smoker    Smokeless tobacco: Never Used   Substance and Sexual Activity    Alcohol use: Yes     Alcohol/week: 14.0 standard drinks     Types: 14 Glasses of wine per week     Frequency: 4 or more times a week     Drinks per session: 1 or 2     Binge frequency: Never    Drug use: No    Sexual activity: Yes     Partners: Male     Birth control/protection: Post-menopausal   Lifestyle    Physical activity     Days per week: 7 days     Minutes per session: 60 min    Stress: Not at all   Relationships    Social connections     Talks on phone: Not on file     Gets together: Not on file     Attends Druze service: Not on file     Active member of club or organization: Not on file     Attends meetings of clubs or organizations: Not on file     Relationship status: Not on file   Other Topics Concern    Not on file   Social History Narrative    Not on file     Family History   Problem Relation Age of Onset    Glaucoma Mother     Lung cancer Father     Cancer Other     Uterine cancer Maternal Grandmother     Blindness Neg Hx     Cataracts Neg Hx     Diabetes Neg Hx     Breast cancer Neg Hx     Colon cancer Neg Hx     Ovarian cancer Neg Hx        Past medical and surgical history reviewed.   I have reviewed the patient's medical history in detail and updated the computerized patient record.    Review of System:   General: no chills, fever, night sweats, weight gain or weight loss  Breasts: no new or changing breast lumps, nipple discharge or masses.  Gastrointestinal: no abdominal pain, change in bowel habits, or black or bloody stools  Genito-Urinary: no incontinence, urinary frequency/urgency or vulvar/vaginal symptoms, pelvic pain or  "abnormal vaginal bleeding.    Physical Exam:   /70   Ht 5' 1" (1.549 m)   Wt 47.8 kg (105 lb 6.1 oz)   BMI 19.91 kg/m²   Constitutional: She appears alert and responsive. She appears well-developed, well-groomed, and well-nourished. No distress.  Breasts: are symmetrical.  Right breast exhibits no inverted nipple, no mass, no nipple discharge, no skin change and no tenderness.  Left breast exhibits no inverted nipple, no mass, no nipple discharge, no skin change and no tenderness.  Abdominal: Soft. She exhibits no distension, hernias or masses. There is no tenderness. No enlargement of liver edge or spleen.  There is no rebound and no guarding.   Genitourinary: Declined exam, pap not indicated and no gyn complaints.     Assessment/Plan:   Encounter for gynecological examination (general) (routine) without abnormal findings    Screening mammogram, encounter for  -     Mammo Digital Screening Bilat w/ Emiliano; Future; Expected date: 12/03/2020      Follow up in 1 year.      "

## 2020-12-29 ENCOUNTER — OFFICE VISIT (OUTPATIENT)
Dept: FAMILY MEDICINE | Facility: CLINIC | Age: 70
End: 2020-12-29
Payer: MEDICARE

## 2020-12-29 VITALS
OXYGEN SATURATION: 99 % | DIASTOLIC BLOOD PRESSURE: 64 MMHG | WEIGHT: 106.06 LBS | HEIGHT: 61 IN | SYSTOLIC BLOOD PRESSURE: 108 MMHG | HEART RATE: 62 BPM | BODY MASS INDEX: 20.02 KG/M2

## 2020-12-29 DIAGNOSIS — G44.219 EPISODIC TENSION-TYPE HEADACHE, NOT INTRACTABLE: ICD-10-CM

## 2020-12-29 DIAGNOSIS — Z20.822 SUSPECTED COVID-19 VIRUS INFECTION: Primary | ICD-10-CM

## 2020-12-29 DIAGNOSIS — R05.9 COUGH: ICD-10-CM

## 2020-12-29 DIAGNOSIS — R09.81 SINUS CONGESTION: ICD-10-CM

## 2020-12-29 LAB
INFLUENZA A, MOLECULAR: NEGATIVE
INFLUENZA B, MOLECULAR: NEGATIVE
SPECIMEN SOURCE: NORMAL

## 2020-12-29 PROCEDURE — 99214 PR OFFICE/OUTPT VISIT, EST, LEVL IV, 30-39 MIN: ICD-10-PCS | Mod: S$PBB,,, | Performed by: PHYSICIAN ASSISTANT

## 2020-12-29 PROCEDURE — 99999 PR PBB SHADOW E&M-EST. PATIENT-LVL III: ICD-10-PCS | Mod: PBBFAC,,, | Performed by: PHYSICIAN ASSISTANT

## 2020-12-29 PROCEDURE — 99214 OFFICE O/P EST MOD 30 MIN: CPT | Mod: S$PBB,,, | Performed by: PHYSICIAN ASSISTANT

## 2020-12-29 PROCEDURE — 99213 OFFICE O/P EST LOW 20 MIN: CPT | Mod: PBBFAC,PO | Performed by: PHYSICIAN ASSISTANT

## 2020-12-29 PROCEDURE — 99999 PR PBB SHADOW E&M-EST. PATIENT-LVL III: CPT | Mod: PBBFAC,,, | Performed by: PHYSICIAN ASSISTANT

## 2020-12-29 PROCEDURE — 87502 INFLUENZA DNA AMP PROBE: CPT | Mod: PO

## 2020-12-29 PROCEDURE — U0003 INFECTIOUS AGENT DETECTION BY NUCLEIC ACID (DNA OR RNA); SEVERE ACUTE RESPIRATORY SYNDROME CORONAVIRUS 2 (SARS-COV-2) (CORONAVIRUS DISEASE [COVID-19]), AMPLIFIED PROBE TECHNIQUE, MAKING USE OF HIGH THROUGHPUT TECHNOLOGIES AS DESCRIBED BY CMS-2020-01-R: HCPCS

## 2020-12-29 RX ORDER — BENZONATATE 100 MG/1
100 CAPSULE ORAL 3 TIMES DAILY PRN
Qty: 30 CAPSULE | Refills: 0 | Status: SHIPPED | OUTPATIENT
Start: 2020-12-29 | End: 2021-01-04 | Stop reason: SDUPTHER

## 2020-12-29 RX ORDER — FLUTICASONE PROPIONATE 50 MCG
1 SPRAY, SUSPENSION (ML) NASAL DAILY
Qty: 18.2 ML | Refills: 0 | Status: SHIPPED | OUTPATIENT
Start: 2020-12-29

## 2020-12-29 RX ORDER — ALBUTEROL SULFATE 90 UG/1
2 AEROSOL, METERED RESPIRATORY (INHALATION) EVERY 6 HOURS PRN
Qty: 18 G | Refills: 0 | Status: SHIPPED | OUTPATIENT
Start: 2020-12-29

## 2020-12-29 RX ORDER — METHYLPREDNISOLONE 4 MG/1
TABLET ORAL
Qty: 1 PACKAGE | Refills: 0 | Status: SHIPPED | OUTPATIENT
Start: 2020-12-29 | End: 2021-01-05 | Stop reason: CLARIF

## 2020-12-29 RX ORDER — MINERAL OIL
180 ENEMA (ML) RECTAL DAILY
Qty: 30 TABLET | Refills: 0 | Status: SHIPPED | OUTPATIENT
Start: 2020-12-29 | End: 2023-01-24

## 2020-12-29 NOTE — PROGRESS NOTES
Subjective:       Patient ID: Zaiar Bansal is a 70 y.o. female.    Chief Complaint: Cough (congestion ), Ear Problem (achy ), and Sore Throat    HPI     Pt is known to me, PCP Dr. Marie.     Pt is a 70 year old female that presents today   complaining of congestion, coughing, earache, and sore throat x 4 days. She also admits to fatigue and body aches. Cough is nonproductive. She admits to a postnasal drip. No fever or chills. No CP or SOB. No obvious sick contacts.     Review of Systems   Constitutional: Negative for chills and fever.   HENT: Positive for nasal congestion, postnasal drip, rhinorrhea, sinus pressure/congestion and sore throat. Negative for trouble swallowing.    Respiratory: Positive for cough. Negative for chest tightness, shortness of breath and wheezing.    Cardiovascular: Negative for chest pain and palpitations.   Gastrointestinal: Negative for abdominal distention, abdominal pain, change in bowel habit, constipation, diarrhea, nausea, vomiting, reflux and change in bowel habit.   Genitourinary: Negative for difficulty urinating, flank pain, frequency, hematuria and urgency.   Musculoskeletal: Positive for myalgias.   Integumentary:  Negative for rash.   Neurological: Positive for light-headedness (once when standing too quickly) and headaches. Negative for dizziness, vertigo, syncope and weakness.   Psychiatric/Behavioral: Negative for sleep disturbance. The patient is not nervous/anxious.          Objective:      Physical Exam  Vitals signs and nursing note reviewed.   Constitutional:       General: She is not in acute distress.     Appearance: Normal appearance. She is not ill-appearing, toxic-appearing or diaphoretic.   HENT:      Head: Normocephalic and atraumatic.      Right Ear: Tympanic membrane, ear canal and external ear normal. There is no impacted cerumen.      Left Ear: Tympanic membrane, ear canal and external ear normal. There is no impacted cerumen.      Nose: Congestion  present. No rhinorrhea.      Mouth/Throat:      Mouth: Mucous membranes are moist.      Pharynx: No oropharyngeal exudate or posterior oropharyngeal erythema.   Eyes:      General: No scleral icterus.        Right eye: No discharge.         Left eye: No discharge.      Extraocular Movements: Extraocular movements intact.      Conjunctiva/sclera: Conjunctivae normal.      Pupils: Pupils are equal, round, and reactive to light.   Neck:      Musculoskeletal: Normal range of motion and neck supple.      Thyroid: No thyroid mass, thyromegaly or thyroid tenderness.   Cardiovascular:      Rate and Rhythm: Normal rate and regular rhythm.      Pulses: Normal pulses.      Heart sounds: Normal heart sounds. No murmur. No friction rub. No gallop.    Pulmonary:      Effort: Pulmonary effort is normal. No respiratory distress.      Breath sounds: Normal breath sounds. No stridor. No wheezing, rhonchi or rales.   Abdominal:      General: Abdomen is flat. Bowel sounds are normal. There is no distension.      Palpations: Abdomen is soft. There is no mass.      Tenderness: There is no abdominal tenderness. There is no guarding or rebound.   Musculoskeletal:         General: No deformity or signs of injury.      Right lower leg: No edema.      Left lower leg: No edema.   Lymphadenopathy:      Head:      Right side of head: No submental, submandibular, tonsillar, preauricular, posterior auricular or occipital adenopathy.      Left side of head: No submental, submandibular, tonsillar, preauricular, posterior auricular or occipital adenopathy.      Cervical: No cervical adenopathy.      Upper Body:      Right upper body: No supraclavicular adenopathy.      Left upper body: No supraclavicular adenopathy.   Skin:     General: Skin is warm.      Capillary Refill: Capillary refill takes less than 2 seconds.      Coloration: Skin is not jaundiced or pale.      Findings: No lesion or rash.   Neurological:      General: No focal deficit  present.      Mental Status: She is alert and oriented to person, place, and time. Mental status is at baseline.      Cranial Nerves: No cranial nerve deficit.      Sensory: No sensory deficit.      Motor: No weakness.      Gait: Gait normal.   Psychiatric:         Mood and Affect: Mood normal.         Behavior: Behavior normal.         Thought Content: Thought content normal.         Judgment: Judgment normal.         Assessment:       1. Suspected COVID-19 virus infection    2. Sinus congestion    3. Cough    4. Episodic tension-type headache, not intractable        Plan:       1. Suspected COVID-19 virus infection  - benzonatate (TESSALON) 100 MG capsule; Take 1 capsule (100 mg total) by mouth 3 (three) times daily as needed for Cough.  Dispense: 30 capsule; Refill: 0    - methylPREDNISolone (MEDROL DOSEPACK) 4 mg tablet; use as directed  Dispense: 1 Package; Refill: 0    - COVID-19 Routine Screening; Future  - Influenza A & B by Molecular    - albuterol (VENTOLIN HFA) 90 mcg/actuation inhaler; Inhale 2 puffs into the lungs every 6 (six) hours as needed for Wheezing. Rescue  Dispense: 18 g; Refill: 0    -isolate until results return  -ER/UC precautions given  -hydrate, eat healthy meals, daily mulitvitamin    2. Sinus congestion    - fluticasone propionate (FLONASE) 50 mcg/actuation nasal spray; 1 spray (50 mcg total) by Each Nostril route once daily.  Dispense: 18.2 mL; Refill: 0    - fexofenadine (ALLEGRA) 180 MG tablet; Take 1 tablet (180 mg total) by mouth once daily.  Dispense: 30 tablet; Refill: 0    3. Cough  - COVID-19 Routine Screening    4. Episodic tension-type headache, not intractable  - COVID-19 Routine Screening    F/U pending results.        CARE GAPS: none to address today

## 2020-12-29 NOTE — PATIENT INSTRUCTIONS
A few reminders from today:    Isolate until results return.   Start medications.   Hydrate, eat healthy, take daily multivitamins.     Follow up with me if needed.   Please go to ER/urgent care if after hours or symptoms persist/worsen.     Do not hesitate to get in touch with me should you have any further questions.     Thank you for trusting me with your care!  I wish you health and happiness.    -Meli Bond PA-C

## 2020-12-30 ENCOUNTER — PATIENT MESSAGE (OUTPATIENT)
Dept: FAMILY MEDICINE | Facility: CLINIC | Age: 70
End: 2020-12-30

## 2020-12-30 ENCOUNTER — INFUSION (OUTPATIENT)
Dept: INFECTIOUS DISEASES | Facility: HOSPITAL | Age: 70
End: 2020-12-30
Attending: PHYSICIAN ASSISTANT
Payer: MEDICARE

## 2020-12-30 ENCOUNTER — TELEPHONE (OUTPATIENT)
Dept: FAMILY MEDICINE | Facility: CLINIC | Age: 70
End: 2020-12-30

## 2020-12-30 VITALS
BODY MASS INDEX: 20.01 KG/M2 | SYSTOLIC BLOOD PRESSURE: 106 MMHG | DIASTOLIC BLOOD PRESSURE: 56 MMHG | WEIGHT: 106 LBS | OXYGEN SATURATION: 96 % | RESPIRATION RATE: 18 BRPM | TEMPERATURE: 98 F | HEIGHT: 61 IN | HEART RATE: 74 BPM

## 2020-12-30 DIAGNOSIS — U07.1 COVID-19: ICD-10-CM

## 2020-12-30 DIAGNOSIS — U07.1 COVID-19: Primary | ICD-10-CM

## 2020-12-30 LAB — SARS-COV-2 RNA RESP QL NAA+PROBE: DETECTED

## 2020-12-30 PROCEDURE — 25000003 PHARM REV CODE 250: Performed by: PHYSICIAN ASSISTANT

## 2020-12-30 PROCEDURE — 63600175 PHARM REV CODE 636 W HCPCS: Performed by: PHYSICIAN ASSISTANT

## 2020-12-30 PROCEDURE — M0243 CASIRIVI AND IMDEVI INFUSION: HCPCS

## 2020-12-30 RX ORDER — ALBUTEROL SULFATE 90 UG/1
2 AEROSOL, METERED RESPIRATORY (INHALATION)
Status: DISCONTINUED | OUTPATIENT
Start: 2020-12-30 | End: 2024-03-05

## 2020-12-30 RX ORDER — EPINEPHRINE 0.1 MG/ML
0.3 INJECTION INTRAVENOUS
Status: DISCONTINUED | OUTPATIENT
Start: 2020-12-30 | End: 2024-03-05

## 2020-12-30 RX ORDER — ACETAMINOPHEN 325 MG/1
650 TABLET ORAL ONCE AS NEEDED
Status: DISCONTINUED | OUTPATIENT
Start: 2020-12-30 | End: 2024-03-05

## 2020-12-30 RX ORDER — ONDANSETRON 4 MG/1
4 TABLET, ORALLY DISINTEGRATING ORAL ONCE AS NEEDED
Status: DISCONTINUED | OUTPATIENT
Start: 2020-12-30 | End: 2024-03-05

## 2020-12-30 RX ORDER — SODIUM CHLORIDE 0.9 % (FLUSH) 0.9 %
10 SYRINGE (ML) INJECTION
Status: DISCONTINUED | OUTPATIENT
Start: 2020-12-30 | End: 2024-03-05

## 2020-12-30 RX ORDER — DIPHENHYDRAMINE HYDROCHLORIDE 50 MG/ML
25 INJECTION INTRAMUSCULAR; INTRAVENOUS ONCE AS NEEDED
Status: DISCONTINUED | OUTPATIENT
Start: 2020-12-30 | End: 2024-03-05

## 2020-12-30 RX ADMIN — CASIRIVIMAB: 1332 INJECTION, SOLUTION, CONCENTRATE INTRAVENOUS at 02:12

## 2020-12-30 RX ADMIN — SODIUM CHLORIDE: 0.9 INJECTION, SOLUTION INTRAVENOUS at 01:12

## 2020-12-30 NOTE — PLAN OF CARE
Problem: Adult Inpatient Plan of Care  Goal: Patient-Specific Goal (Individualization)  Outcome: Ongoing, Progressing  Flowsheets (Taken 12/30/2020 2022)  Individualized Care Needs: Recliner, Pillow, TV, Water  Anxieties, Fears or Concerns: Covid19  Patient-Specific Goals (Include Timeframe): no s/s of rx during tx

## 2020-12-30 NOTE — TELEPHONE ENCOUNTER
Called and offered BAM infusion. Pt would like this. Ordered. Will contact me if she does not hear back

## 2020-12-30 NOTE — PLAN OF CARE
Problem: Adult Inpatient Plan of Care  Goal: Plan of Care Review  Outcome: Ongoing, Progressing  Flowsheets (Taken 12/30/2020 9863)  Plan of Care Reviewed With: patient     Pt tolerated monoclonal antibodies treatment without difficulty.  Ambulated from facility with no acute distress.

## 2020-12-31 ENCOUNTER — PATIENT MESSAGE (OUTPATIENT)
Dept: FAMILY MEDICINE | Facility: CLINIC | Age: 70
End: 2020-12-31

## 2020-12-31 DIAGNOSIS — H66.91 RIGHT OTITIS MEDIA, UNSPECIFIED OTITIS MEDIA TYPE: Primary | ICD-10-CM

## 2020-12-31 DIAGNOSIS — Z20.822 SUSPECTED COVID-19 VIRUS INFECTION: ICD-10-CM

## 2020-12-31 RX ORDER — AMOXICILLIN AND CLAVULANATE POTASSIUM 875; 125 MG/1; MG/1
1 TABLET, FILM COATED ORAL EVERY 12 HOURS
Qty: 10 TABLET | Refills: 0 | Status: SHIPPED | OUTPATIENT
Start: 2020-12-31 | End: 2021-01-05 | Stop reason: CLARIF

## 2021-01-01 ENCOUNTER — TELEPHONE (OUTPATIENT)
Dept: ADMINISTRATIVE | Facility: CLINIC | Age: 71
End: 2021-01-01

## 2021-01-04 ENCOUNTER — PATIENT MESSAGE (OUTPATIENT)
Dept: FAMILY MEDICINE | Facility: CLINIC | Age: 71
End: 2021-01-04

## 2021-01-04 RX ORDER — BENZONATATE 100 MG/1
100 CAPSULE ORAL 3 TIMES DAILY PRN
Qty: 30 CAPSULE | Refills: 0 | Status: SHIPPED | OUTPATIENT
Start: 2021-01-04 | End: 2021-01-14

## 2021-01-05 PROBLEM — R47.81 SLURRED SPEECH: Status: ACTIVE | Noted: 2021-01-05

## 2021-02-08 ENCOUNTER — PATIENT MESSAGE (OUTPATIENT)
Dept: FAMILY MEDICINE | Facility: CLINIC | Age: 71
End: 2021-02-08

## 2021-03-11 ENCOUNTER — PATIENT MESSAGE (OUTPATIENT)
Dept: FAMILY MEDICINE | Facility: CLINIC | Age: 71
End: 2021-03-11

## 2021-03-22 ENCOUNTER — PATIENT MESSAGE (OUTPATIENT)
Dept: FAMILY MEDICINE | Facility: CLINIC | Age: 71
End: 2021-03-22

## 2021-05-04 ENCOUNTER — IMMUNIZATION (OUTPATIENT)
Dept: PHARMACY | Facility: CLINIC | Age: 71
End: 2021-05-04
Payer: MEDICARE

## 2021-07-14 ENCOUNTER — OFFICE VISIT (OUTPATIENT)
Dept: OPTOMETRY | Facility: CLINIC | Age: 71
End: 2021-07-14
Payer: MEDICARE

## 2021-07-14 DIAGNOSIS — H04.123 BILATERAL DRY EYES: ICD-10-CM

## 2021-07-14 DIAGNOSIS — H25.13 NUCLEAR SCLEROSIS OF BOTH EYES: Primary | ICD-10-CM

## 2021-07-14 DIAGNOSIS — H52.7 REFRACTIVE ERROR: ICD-10-CM

## 2021-07-14 PROCEDURE — 92015 DETERMINE REFRACTIVE STATE: CPT | Mod: ,,, | Performed by: OPTOMETRIST

## 2021-07-14 PROCEDURE — 92004 PR EYE EXAM, NEW PATIENT,COMPREHESV: ICD-10-PCS | Mod: S$PBB,,, | Performed by: OPTOMETRIST

## 2021-07-14 PROCEDURE — 92015 PR REFRACTION: ICD-10-PCS | Mod: ,,, | Performed by: OPTOMETRIST

## 2021-07-14 PROCEDURE — 92004 COMPRE OPH EXAM NEW PT 1/>: CPT | Mod: S$PBB,,, | Performed by: OPTOMETRIST

## 2021-07-14 PROCEDURE — 99999 PR PBB SHADOW E&M-EST. PATIENT-LVL III: CPT | Mod: PBBFAC,,, | Performed by: OPTOMETRIST

## 2021-07-14 PROCEDURE — 99999 PR PBB SHADOW E&M-EST. PATIENT-LVL III: ICD-10-PCS | Mod: PBBFAC,,, | Performed by: OPTOMETRIST

## 2021-07-14 PROCEDURE — 99213 OFFICE O/P EST LOW 20 MIN: CPT | Mod: PBBFAC,PO | Performed by: OPTOMETRIST

## 2021-07-14 RX ORDER — DEXBROMPHENIRAMINE MALEATE 2 MG/1
1 TABLET ORAL EVERY 6 HOURS PRN
COMMUNITY
Start: 2021-05-24

## 2021-07-14 RX ORDER — AZELASTINE 1 MG/ML
SPRAY, METERED NASAL
COMMUNITY
Start: 2021-05-24

## 2021-11-08 ENCOUNTER — IMMUNIZATION (OUTPATIENT)
Dept: FAMILY MEDICINE | Facility: CLINIC | Age: 71
End: 2021-11-08
Payer: MEDICARE

## 2021-11-08 PROCEDURE — G0008 ADMIN INFLUENZA VIRUS VAC: HCPCS | Mod: PBBFAC,PO

## 2021-11-08 PROCEDURE — 90694 VACC AIIV4 NO PRSRV 0.5ML IM: CPT | Mod: PBBFAC,PO

## 2021-12-01 ENCOUNTER — TELEPHONE (OUTPATIENT)
Dept: OBSTETRICS AND GYNECOLOGY | Facility: CLINIC | Age: 71
End: 2021-12-01
Payer: MEDICARE

## 2021-12-01 DIAGNOSIS — Z12.31 SCREENING MAMMOGRAM, ENCOUNTER FOR: Primary | ICD-10-CM

## 2022-01-09 ENCOUNTER — PATIENT OUTREACH (OUTPATIENT)
Dept: ADMINISTRATIVE | Facility: OTHER | Age: 72
End: 2022-01-09
Payer: MEDICARE

## 2022-01-10 NOTE — PROGRESS NOTES
Health Maintenance Due   Topic Date Due    Pneumococcal Vaccines (Age 65+) (1 of 1 - PPSV23) Never done    COVID-19 Vaccine (3 - Booster for Pfizer series) 10/14/2021     Updates were requested from care everywhere.  Chart was reviewed for overdue Proactive [facility] Encounters ([facility]) topics (CRS, Breast Cancer Screening, Eye exam)  Health Maintenance has been updated.  LINKS immunization registry triggered.  Immunizations were reconciled.

## 2022-01-11 ENCOUNTER — OFFICE VISIT (OUTPATIENT)
Dept: OBSTETRICS AND GYNECOLOGY | Facility: CLINIC | Age: 72
End: 2022-01-11
Payer: MEDICARE

## 2022-01-11 ENCOUNTER — HOSPITAL ENCOUNTER (OUTPATIENT)
Dept: RADIOLOGY | Facility: HOSPITAL | Age: 72
Discharge: HOME | End: 2022-01-11
Attending: OBSTETRICS & GYNECOLOGY
Payer: MEDICARE

## 2022-01-11 VITALS
HEIGHT: 61 IN | SYSTOLIC BLOOD PRESSURE: 108 MMHG | WEIGHT: 106.69 LBS | DIASTOLIC BLOOD PRESSURE: 68 MMHG | BODY MASS INDEX: 20.14 KG/M2

## 2022-01-11 DIAGNOSIS — Z12.31 SCREENING MAMMOGRAM, ENCOUNTER FOR: ICD-10-CM

## 2022-01-11 DIAGNOSIS — Z01.419 ENCOUNTER FOR GYNECOLOGICAL EXAMINATION (GENERAL) (ROUTINE) WITHOUT ABNORMAL FINDINGS: Primary | ICD-10-CM

## 2022-01-11 DIAGNOSIS — M85.80 OSTEOPENIA, UNSPECIFIED LOCATION: ICD-10-CM

## 2022-01-11 DIAGNOSIS — N95.8 OTHER SPECIFIED MENOPAUSAL AND PERIMENOPAUSAL DISORDERS: ICD-10-CM

## 2022-01-11 DIAGNOSIS — N64.4 BREAST TENDERNESS IN FEMALE: ICD-10-CM

## 2022-01-11 DIAGNOSIS — Z12.31 BREAST CANCER SCREENING BY MAMMOGRAM: ICD-10-CM

## 2022-01-11 PROCEDURE — 99999 PR PBB SHADOW E&M-EST. PATIENT-LVL IV: ICD-10-PCS | Mod: PBBFAC,,, | Performed by: OBSTETRICS & GYNECOLOGY

## 2022-01-11 PROCEDURE — 99999 PR PBB SHADOW E&M-EST. PATIENT-LVL IV: CPT | Mod: PBBFAC,,, | Performed by: OBSTETRICS & GYNECOLOGY

## 2022-01-11 PROCEDURE — 77063 MAMMO DIGITAL SCREENING BILAT WITH TOMO: ICD-10-PCS | Mod: 26,,, | Performed by: RADIOLOGY

## 2022-01-11 PROCEDURE — G0101 CA SCREEN;PELVIC/BREAST EXAM: HCPCS | Mod: S$PBB,,, | Performed by: OBSTETRICS & GYNECOLOGY

## 2022-01-11 PROCEDURE — 99214 OFFICE O/P EST MOD 30 MIN: CPT | Mod: PBBFAC,PN | Performed by: OBSTETRICS & GYNECOLOGY

## 2022-01-11 PROCEDURE — 77063 BREAST TOMOSYNTHESIS BI: CPT | Mod: 26,,, | Performed by: RADIOLOGY

## 2022-01-11 PROCEDURE — 77067 MAMMO DIGITAL SCREENING BILAT WITH TOMO: ICD-10-PCS | Mod: 26,,, | Performed by: RADIOLOGY

## 2022-01-11 PROCEDURE — 77067 SCR MAMMO BI INCL CAD: CPT | Mod: TC,PN

## 2022-01-11 PROCEDURE — 77067 SCR MAMMO BI INCL CAD: CPT | Mod: 26,,, | Performed by: RADIOLOGY

## 2022-01-11 PROCEDURE — G0101 PR CA SCREEN;PELVIC/BREAST EXAM: ICD-10-PCS | Mod: S$PBB,,, | Performed by: OBSTETRICS & GYNECOLOGY

## 2022-01-11 NOTE — PROGRESS NOTES
Chief Complaint   Patient presents with    Well Woman    Mammogram     Has sore area between breast and underarm area on the right       History and Physical:  Zaira Bansal is a 71 y.o. F who presents today for her routine annual GYN exam. The patient has Gynecology complaints: breast tenderness       No LMP recorded. Patient is postmenopausal.  Last Pap: 3/2018 NILM  History of abnormal pap: never  Last Mammogram: 12/2020 [facility] 1  Colonosocpy: years ago normal.   DEXA: 3/2019 Osteopenia, takes Calcium  PCP: Kishore Marie MD  Orders routine labs  Immunization status: stated as current, but no records available. S/p Zoster  Body mass index is 20.16 kg/m².    Allergies:   Review of patient's allergies indicates:   Allergen Reactions    Compazine [prochlorperazine edisylate]      Lock jaw     Past Medical History:   Diagnosis Date    Arthritis pain of hand 11/9/2020    Dermatitis      Past Surgical History:   Procedure Laterality Date    DILATION AND CURETTAGE OF UTERUS      TONSILLECTOMY       MEDS:   Current Outpatient Medications on File Prior to Visit   Medication Sig Dispense Refill    ALA-HIST IR 2 mg Tab Take 1 tablet by mouth every 6 (six) hours as needed.      azelastine (ASTELIN) 137 mcg (0.1 %) nasal spray SMARTSIG:Both Nares      clobetasol (CLOBEX) 0.05 % topical spray APPLY TO SKIN TWICE DAILY AS NEEDED FOR ITCHING. USE SPARINGLY, CAN CAUSE THINNING OF SKIN  0    fluticasone propionate (FLONASE) 50 mcg/actuation nasal spray 1 spray (50 mcg total) by Each Nostril route once daily. 18.2 mL 0    meloxicam (MOBIC) 7.5 MG tablet TAKE 1 TABLET(7.5 MG) BY MOUTH EVERY DAY 30 tablet 2    albuterol (VENTOLIN HFA) 90 mcg/actuation inhaler Inhale 2 puffs into the lungs every 6 (six) hours as needed for Wheezing. Rescue (Patient not taking: No sig reported) 18 g 0    fexofenadine (ALLEGRA) 180 MG tablet Take 1 tablet (180 mg total) by mouth once daily. 30 tablet 0    pulse oximeter (PULSE OXIMETER)  device Apply Externally route 2 (two) times a day. Use twice daily at 8 AM and 3 PM and record the value in MyChart as directed. (Patient not taking: No sig reported) 1 each 0     Current Facility-Administered Medications on File Prior to Visit   Medication Dose Route Frequency Provider Last Rate Last Admin    acetaminophen tablet 650 mg  650 mg Oral Once PRN Meli Bond PA-C        albuterol inhaler 2 puff  2 puff Inhalation Q20 Min PRN Meli Bond PA-C        diphenhydrAMINE injection 25 mg  25 mg Intravenous Once PRN Meli Bond PA-C        EPINEPHrine 0.1 mg/mL injection 0.3 mg  0.3 mg Intramuscular PRN Meli Bond PA-C        methylPREDNISolone sodium succinate injection 40 mg  40 mg Intravenous Once PRN Meli Bond PA-C        ondansetron disintegrating tablet 4 mg  4 mg Oral Once PRN Meli Bond PA-C        sodium chloride 0.9% 500 mL flush bag   Intravenous PRN Meli Bond PA-C   Stopped at 20 1620    sodium chloride 0.9% flush 10 mL  10 mL Intravenous PRN Meli Bond PA-C         OB History        3    Para   2    Term   2            AB   1    Living   2       SAB   1    IAB        Ectopic        Multiple        Live Births   2               Social History     Socioeconomic History    Marital status:    Tobacco Use    Smoking status: Never Smoker    Smokeless tobacco: Never Used   Substance and Sexual Activity    Alcohol use: Yes     Alcohol/week: 14.0 standard drinks     Types: 14 Glasses of wine per week    Drug use: No    Sexual activity: Yes     Partners: Male     Birth control/protection: Post-menopausal     Family History   Problem Relation Age of Onset    Glaucoma Mother     Lung cancer Father     Cancer Other     Uterine cancer Maternal Grandmother     Ovarian cancer Maternal Grandmother 70    Blindness Neg Hx     Cataracts Neg Hx     Diabetes Neg Hx     Breast cancer Neg Hx     Colon cancer Neg Hx     Macular  "degeneration Neg Hx     Retinal detachment Neg Hx        Past medical and surgical history reviewed.   I have reviewed the patient's medical history in detail and updated the computerized patient record.    Review of System:   General: no chills, fever, night sweats, weight gain or weight loss  Breasts: no new or changing breast lumps, nipple discharge or masses.  Gastrointestinal: no abdominal pain, change in bowel habits, or black or bloody stools  Genito-Urinary: no incontinence, urinary frequency/urgency or vulvar/vaginal symptoms, pelvic pain or abnormal vaginal bleeding.    Physical Exam:   /68   Ht 5' 1" (1.549 m)   Wt 48.4 kg (106 lb 11.2 oz)   BMI 20.16 kg/m²   Constitutional: She appears alert and responsive. She appears well-developed, well-groomed, and well-nourished. No distress.  Breasts: are symmetrical.  Right breast exhibits no inverted nipple, no mass, no nipple discharge, no skin change and no tenderness.  Left breast exhibits no inverted nipple, no mass, no nipple discharge, no skin change and no tenderness.  Abdominal: Soft. She exhibits no distension, hernias or masses. There is no tenderness. No enlargement of liver edge or spleen.  There is no rebound and no guarding.   Genitourinary: Deferred. Pap not indicated and no GYN complaints. Offered patient exam, patient declined exam.     Assessment/Plan:   Encounter for gynecological examination (general) (routine) without abnormal findings    Osteopenia, unspecified location  -     DXA Bone Density Spine And Hip; Future; Expected date: 01/11/2022    Other specified menopausal and perimenopausal disorders   -     DXA Bone Density Spine And Hip; Future; Expected date: 01/11/2022    Breast cancer screening by mammogram  -     Mammo Digital Screening Bilat w/ Emiliano; Future; Expected date: 01/11/2022    Breast tenderness in female    breast exam wnl, advised to take NSAIDs    Follow up in 1 year.      "

## 2022-01-24 ENCOUNTER — PATIENT MESSAGE (OUTPATIENT)
Dept: FAMILY MEDICINE | Facility: CLINIC | Age: 72
End: 2022-01-24
Payer: MEDICARE

## 2022-01-26 ENCOUNTER — OFFICE VISIT (OUTPATIENT)
Dept: FAMILY MEDICINE | Facility: CLINIC | Age: 72
End: 2022-01-26
Payer: MEDICARE

## 2022-01-26 VITALS — DIASTOLIC BLOOD PRESSURE: 70 MMHG | SYSTOLIC BLOOD PRESSURE: 116 MMHG | HEART RATE: 74 BPM | OXYGEN SATURATION: 99 %

## 2022-01-26 DIAGNOSIS — M77.8 LEFT WRIST TENDONITIS: ICD-10-CM

## 2022-01-26 DIAGNOSIS — H60.331 ACUTE SWIMMER'S EAR OF RIGHT SIDE: Primary | ICD-10-CM

## 2022-01-26 DIAGNOSIS — Z00.00 PREVENTATIVE HEALTH CARE: ICD-10-CM

## 2022-01-26 PROCEDURE — 99214 PR OFFICE/OUTPT VISIT, EST, LEVL IV, 30-39 MIN: ICD-10-PCS | Mod: S$PBB,,, | Performed by: PHYSICIAN ASSISTANT

## 2022-01-26 PROCEDURE — 99999 PR PBB SHADOW E&M-EST. PATIENT-LVL III: CPT | Mod: PBBFAC,,, | Performed by: PHYSICIAN ASSISTANT

## 2022-01-26 PROCEDURE — 99214 OFFICE O/P EST MOD 30 MIN: CPT | Mod: S$PBB,,, | Performed by: PHYSICIAN ASSISTANT

## 2022-01-26 PROCEDURE — G0009 ADMIN PNEUMOCOCCAL VACCINE: HCPCS | Mod: PBBFAC,PO

## 2022-01-26 PROCEDURE — 99213 OFFICE O/P EST LOW 20 MIN: CPT | Mod: PBBFAC,PO | Performed by: PHYSICIAN ASSISTANT

## 2022-01-26 PROCEDURE — 99999 PR PBB SHADOW E&M-EST. PATIENT-LVL III: ICD-10-PCS | Mod: PBBFAC,,, | Performed by: PHYSICIAN ASSISTANT

## 2022-01-26 RX ORDER — MELOXICAM 7.5 MG/1
TABLET ORAL
Qty: 30 TABLET | Refills: 2 | Status: SHIPPED | OUTPATIENT
Start: 2022-01-26 | End: 2024-03-05 | Stop reason: SDUPTHER

## 2022-01-26 RX ORDER — NEOMYCIN SULFATE, POLYMYXIN B SULFATE AND HYDROCORTISONE 10; 3.5; 1 MG/ML; MG/ML; [USP'U]/ML
3 SUSPENSION/ DROPS AURICULAR (OTIC) 4 TIMES DAILY
Qty: 6 ML | Refills: 0 | Status: SHIPPED | OUTPATIENT
Start: 2022-01-26 | End: 2022-02-02

## 2022-01-26 NOTE — PATIENT INSTRUCTIONS
A few reminders from today:    Start antibiotic ear drops.   Mobic as needed for wrist pain.   Pneumonia vaccine today.     Follow up with me if needed.   Please go to ER/urgent care if after hours or symptoms persist/worsen.     Do not hesitate to get in touch with me should you have any further questions.     Thank you for trusting me with your care!  I wish you health and happiness.    -Meli Bond PA-C

## 2022-01-26 NOTE — PROGRESS NOTES
Subjective:       Patient ID: Zaira Bansal is a 71 y.o. female.    Chief Complaint: Otalgia (Right ear, 3 days)    HPI     Pt is knonw to me, PCP Dr. Marie.     Pt is a 71 year old female with HLD and osteopenia. She presents today complaining of right ear pain x 3 days. No tenderness to touch or drainage.      Review of Systems   Constitutional: Negative for activity change, appetite change, chills, fatigue, fever and unexpected weight change.   HENT: Positive for ear pain (right). Negative for nasal congestion, hearing loss, rhinorrhea and sore throat.    Eyes: Negative for visual disturbance.   Respiratory: Negative for cough, chest tightness, shortness of breath and wheezing.    Cardiovascular: Negative for chest pain and palpitations.   Gastrointestinal: Negative for abdominal pain, change in bowel habit, constipation, diarrhea, nausea, vomiting, reflux and change in bowel habit.   Genitourinary: Negative for difficulty urinating, dysuria, frequency, hematuria and urgency.   Musculoskeletal: Negative for arthralgias and myalgias.   Integumentary:  Negative for rash.   Neurological: Negative for dizziness, vertigo, seizures, syncope, weakness, light-headedness and headaches.   Psychiatric/Behavioral: Negative for dysphoric mood, self-injury, sleep disturbance and suicidal ideas. The patient is not nervous/anxious.          Objective:      Physical Exam  Vitals and nursing note reviewed.   Constitutional:       General: She is not in acute distress.     Appearance: Normal appearance. She is not ill-appearing, toxic-appearing or diaphoretic.   HENT:      Head: Normocephalic and atraumatic.      Right Ear: Tympanic membrane normal. There is no impacted cerumen.      Left Ear: Tympanic membrane, ear canal and external ear normal. There is no impacted cerumen.      Ears:      Comments: Erythema and maceration of right external canal, inferior aspect  Eyes:      General: No scleral icterus.        Right eye: No  discharge.         Left eye: No discharge.      Extraocular Movements: Extraocular movements intact.      Conjunctiva/sclera: Conjunctivae normal.      Pupils: Pupils are equal, round, and reactive to light.   Cardiovascular:      Rate and Rhythm: Normal rate and regular rhythm.      Pulses: Normal pulses.      Heart sounds: Normal heart sounds. No murmur heard.  No friction rub. No gallop.    Pulmonary:      Effort: Pulmonary effort is normal. No respiratory distress.      Breath sounds: Normal breath sounds. No stridor. No wheezing, rhonchi or rales.   Musculoskeletal:         General: No deformity or signs of injury.      Cervical back: Normal range of motion and neck supple.      Right lower leg: No edema.      Left lower leg: No edema.   Lymphadenopathy:      Cervical: No cervical adenopathy.   Skin:     General: Skin is warm.      Capillary Refill: Capillary refill takes less than 2 seconds.      Coloration: Skin is not jaundiced or pale.      Findings: No lesion or rash.   Neurological:      General: No focal deficit present.      Mental Status: She is alert and oriented to person, place, and time. Mental status is at baseline.   Psychiatric:         Mood and Affect: Mood normal.         Behavior: Behavior normal.         Thought Content: Thought content normal.         Judgment: Judgment normal.         Assessment:       Problem List Items Addressed This Visit    None     Visit Diagnoses     Acute swimmer's ear of right side    -  Primary    Relevant Medications    neomycin-polymyxin-hydrocortisone (CORTISPORIN) 3.5-10,000-1 mg/mL-unit/mL-% otic suspension    Left wrist tendonitis        Relevant Medications    meloxicam (MOBIC) 7.5 MG tablet    Preventative health care        Relevant Orders    (In Office Administered) Pneumococcal Polysaccharide Vaccine (23 Valent) (SQ/IM)          Plan:       1. Acute swimmer's ear of right side  - neomycin-polymyxin-hydrocortisone (CORTISPORIN) 3.5-10,000-1  mg/mL-unit/mL-% otic suspension; Place 3 drops into the right ear 4 (four) times daily. for 7 days  Dispense: 6 mL; Refill: 0    2. Left wrist tendonitis  - meloxicam (MOBIC) 7.5 MG tablet; TAKE 1 TABLET(7.5 MG) BY mouth daily as needed for wrist pain  Dispense: 30 tablet; Refill: 2    3. Preventative health care  - (In Office Administered) Pneumococcal Polysaccharide Vaccine (23 Valent) (SQ/IM)       RTC/ER precautions given. F/U if symptoms persist or worsen

## 2022-01-28 ENCOUNTER — HOSPITAL ENCOUNTER (OUTPATIENT)
Dept: RADIOLOGY | Facility: HOSPITAL | Age: 72
Discharge: HOME | End: 2022-01-28
Attending: OBSTETRICS & GYNECOLOGY
Payer: MEDICARE

## 2022-01-28 DIAGNOSIS — N95.8 OTHER SPECIFIED MENOPAUSAL AND PERIMENOPAUSAL DISORDERS: ICD-10-CM

## 2022-01-28 DIAGNOSIS — M85.80 OSTEOPENIA, UNSPECIFIED LOCATION: ICD-10-CM

## 2022-01-28 PROCEDURE — 77080 DXA BONE DENSITY AXIAL: CPT | Mod: TC,PO

## 2022-01-28 PROCEDURE — 77080 DEXA BONE DENSITY SPINE HIP: ICD-10-PCS | Mod: 26,,, | Performed by: RADIOLOGY

## 2022-01-28 PROCEDURE — 77080 DXA BONE DENSITY AXIAL: CPT | Mod: 26,,, | Performed by: RADIOLOGY

## 2022-01-31 ENCOUNTER — PATIENT MESSAGE (OUTPATIENT)
Dept: OBSTETRICS AND GYNECOLOGY | Facility: CLINIC | Age: 72
End: 2022-01-31
Payer: MEDICARE

## 2022-05-09 ENCOUNTER — PATIENT MESSAGE (OUTPATIENT)
Dept: SMOKING CESSATION | Facility: CLINIC | Age: 72
End: 2022-05-09
Payer: MEDICARE

## 2022-10-24 ENCOUNTER — OFFICE VISIT (OUTPATIENT)
Dept: OPTOMETRY | Facility: CLINIC | Age: 72
End: 2022-10-24
Payer: MEDICARE

## 2022-10-24 DIAGNOSIS — H16.9 KERATITIS: ICD-10-CM

## 2022-10-24 DIAGNOSIS — H10.32 ACUTE BACTERIAL CONJUNCTIVITIS OF LEFT EYE: Primary | ICD-10-CM

## 2022-10-24 PROCEDURE — 92012 PR EYE EXAM, EST PATIENT,INTERMED: ICD-10-PCS | Mod: S$PBB,,, | Performed by: OPTOMETRIST

## 2022-10-24 PROCEDURE — 99213 OFFICE O/P EST LOW 20 MIN: CPT | Mod: PBBFAC,PO | Performed by: OPTOMETRIST

## 2022-10-24 PROCEDURE — 92012 INTRM OPH EXAM EST PATIENT: CPT | Mod: S$PBB,,, | Performed by: OPTOMETRIST

## 2022-10-24 PROCEDURE — 99999 PR PBB SHADOW E&M-EST. PATIENT-LVL III: CPT | Mod: PBBFAC,,, | Performed by: OPTOMETRIST

## 2022-10-24 PROCEDURE — 99999 PR PBB SHADOW E&M-EST. PATIENT-LVL III: ICD-10-PCS | Mod: PBBFAC,,, | Performed by: OPTOMETRIST

## 2022-10-24 RX ORDER — NEOMYCIN SULFATE, POLYMYXIN B SULFATE AND DEXAMETHASONE 3.5; 10000; 1 MG/ML; [USP'U]/ML; MG/ML
1 SUSPENSION/ DROPS OPHTHALMIC 4 TIMES DAILY
Qty: 5 ML | Refills: 0 | Status: SHIPPED | OUTPATIENT
Start: 2022-10-24 | End: 2022-10-31

## 2022-10-24 NOTE — PROGRESS NOTES
"HPI    + burning,pain, tearing, and FBS OS x's 1 wk  + 5/10 pain OS right now  Denies any blurry VA or flashes  + floaters "sometimes" off and on x's a couple years    Systane gtts BID OU (today pt. Used more often)  Last edited by Martha De Los Santos on 10/24/2022  3:19 PM.            Assessment /Plan     For exam results, see Encounter Report.    Acute bacterial conjunctivitis of left eye  -     neomycin-polymyxin-dexamethasone (MAXITROL) 3.5mg/mL-10,000 unit/mL-0.1 % DrpS; Place 1 drop into both eyes 4 (four) times daily. for 7 days  Dispense: 5 mL; Refill: 0    Keratitis      Improved comfort with fluress, start maxitrol drops 4x/day x 1 week, RTC or call if no better in 1 week. RTC full eye exam in Nov                   "

## 2022-11-07 RX ORDER — NEOMYCIN SULFATE, POLYMYXIN B SULFATE AND DEXAMETHASONE 3.5; 10000; 1 MG/ML; [USP'U]/ML; MG/ML
1 SUSPENSION/ DROPS OPHTHALMIC 4 TIMES DAILY
Qty: 5 ML | Refills: 0 | Status: SHIPPED | OUTPATIENT
Start: 2022-11-07 | End: 2022-11-14

## 2022-12-02 ENCOUNTER — OFFICE VISIT (OUTPATIENT)
Dept: OPTOMETRY | Facility: CLINIC | Age: 72
End: 2022-12-02
Payer: MEDICARE

## 2022-12-02 ENCOUNTER — IMMUNIZATION (OUTPATIENT)
Dept: FAMILY MEDICINE | Facility: CLINIC | Age: 72
End: 2022-12-02
Payer: MEDICARE

## 2022-12-02 DIAGNOSIS — H04.123 BILATERAL DRY EYES: ICD-10-CM

## 2022-12-02 DIAGNOSIS — H52.7 REFRACTIVE ERROR: ICD-10-CM

## 2022-12-02 DIAGNOSIS — H25.13 NUCLEAR SCLEROSIS OF BOTH EYES: Primary | ICD-10-CM

## 2022-12-02 PROCEDURE — 99999 PR PBB SHADOW E&M-EST. PATIENT-LVL III: ICD-10-PCS | Mod: PBBFAC,,, | Performed by: OPTOMETRIST

## 2022-12-02 PROCEDURE — 99999 PR PBB SHADOW E&M-EST. PATIENT-LVL III: CPT | Mod: PBBFAC,,, | Performed by: OPTOMETRIST

## 2022-12-02 PROCEDURE — 99213 OFFICE O/P EST LOW 20 MIN: CPT | Mod: PBBFAC,PO | Performed by: OPTOMETRIST

## 2022-12-02 PROCEDURE — 92014 PR EYE EXAM, EST PATIENT,COMPREHESV: ICD-10-PCS | Mod: S$PBB,,, | Performed by: OPTOMETRIST

## 2022-12-02 PROCEDURE — G0008 ADMIN INFLUENZA VIRUS VAC: HCPCS | Mod: PBBFAC,PO

## 2022-12-02 PROCEDURE — 92014 COMPRE OPH EXAM EST PT 1/>: CPT | Mod: S$PBB,,, | Performed by: OPTOMETRIST

## 2022-12-02 NOTE — PROGRESS NOTES
HPI    Dle- 07/14/2021    Pt here for routine eye exam. Pt sts no changes to va. Floaters,   occasional, nothing new, no increase. Denies flashes.eye pain. Gtts: otc   pataday prn, otc systane prn.   Last edited by Yanni Connell MA on 12/2/2022  1:52 PM.            Assessment /Plan     For exam results, see Encounter Report.    Nuclear sclerosis of both eyes    Refractive error    Bilateral dry eyes      Educated pt on presence of cataracts and effects on vision. No surgery at this time. Recheck in one year.   2. Spectacle Rx given, discussed different options for glasses. RTC 1 year routine eye exam.   3. Recommend artificial tears. 1 drop 2x per day. Chronicity of disease and treatment discussed.

## 2023-01-24 ENCOUNTER — HOSPITAL ENCOUNTER (OUTPATIENT)
Dept: RADIOLOGY | Facility: HOSPITAL | Age: 73
Discharge: HOME | End: 2023-01-24
Attending: OBSTETRICS & GYNECOLOGY
Payer: MEDICARE

## 2023-01-24 ENCOUNTER — OFFICE VISIT (OUTPATIENT)
Dept: OBSTETRICS AND GYNECOLOGY | Facility: CLINIC | Age: 73
End: 2023-01-24
Payer: MEDICARE

## 2023-01-24 VITALS
WEIGHT: 106.69 LBS | HEIGHT: 61 IN | BODY MASS INDEX: 20.14 KG/M2 | DIASTOLIC BLOOD PRESSURE: 74 MMHG | SYSTOLIC BLOOD PRESSURE: 120 MMHG

## 2023-01-24 DIAGNOSIS — Z12.31 BREAST CANCER SCREENING BY MAMMOGRAM: ICD-10-CM

## 2023-01-24 DIAGNOSIS — Z01.419 ENCOUNTER FOR GYNECOLOGICAL EXAMINATION (GENERAL) (ROUTINE) WITHOUT ABNORMAL FINDINGS: Primary | ICD-10-CM

## 2023-01-24 PROCEDURE — 99999 PR PBB SHADOW E&M-EST. PATIENT-LVL III: ICD-10-PCS | Mod: PBBFAC,,, | Performed by: OBSTETRICS & GYNECOLOGY

## 2023-01-24 PROCEDURE — G0101 PR CA SCREEN;PELVIC/BREAST EXAM: ICD-10-PCS | Mod: S$PBB,,, | Performed by: OBSTETRICS & GYNECOLOGY

## 2023-01-24 PROCEDURE — 77067 SCR MAMMO BI INCL CAD: CPT | Mod: 26,,, | Performed by: RADIOLOGY

## 2023-01-24 PROCEDURE — 77067 MAMMO DIGITAL SCREENING BILAT WITH TOMO: ICD-10-PCS | Mod: 26,,, | Performed by: RADIOLOGY

## 2023-01-24 PROCEDURE — 99999 PR PBB SHADOW E&M-EST. PATIENT-LVL III: CPT | Mod: PBBFAC,,, | Performed by: OBSTETRICS & GYNECOLOGY

## 2023-01-24 PROCEDURE — 77063 BREAST TOMOSYNTHESIS BI: CPT | Mod: 26,,, | Performed by: RADIOLOGY

## 2023-01-24 PROCEDURE — 77063 MAMMO DIGITAL SCREENING BILAT WITH TOMO: ICD-10-PCS | Mod: 26,,, | Performed by: RADIOLOGY

## 2023-01-24 PROCEDURE — 77067 SCR MAMMO BI INCL CAD: CPT | Mod: TC,PN

## 2023-01-24 PROCEDURE — G0101 CA SCREEN;PELVIC/BREAST EXAM: HCPCS | Mod: S$PBB,,, | Performed by: OBSTETRICS & GYNECOLOGY

## 2023-01-24 PROCEDURE — 99213 OFFICE O/P EST LOW 20 MIN: CPT | Mod: PBBFAC,PN,25 | Performed by: OBSTETRICS & GYNECOLOGY

## 2023-01-24 PROCEDURE — G0101 CA SCREEN;PELVIC/BREAST EXAM: HCPCS | Mod: PBBFAC,PN | Performed by: OBSTETRICS & GYNECOLOGY

## 2023-01-24 NOTE — PROGRESS NOTES
Chief Complaint   Patient presents with    Well Woman       History and Physical:  Zaira Bansal is a 72 y.o. F who presents today for her routine annual GYN exam.     Annual:   No LMP recorded. Patient is postmenopausal.  Last Pap: 3/2018 NILM  History of abnormal pap: never  Last Mammogram: 1/2022 BIRADS 1; Tyrer-Cuzick risk assessment model is 2.41 %  Colonosocpy: years ago normal per patient  DEXA: 1/2022 Osteopenia  PCP: Kishore Marie MD  Orders routine labs 11/2020  Immunization status: stated as current, but no records available. S/p Zoster  Body mass index is 20.16 kg/m².    Allergies:   Review of patient's allergies indicates:   Allergen Reactions    Compazine [prochlorperazine edisylate]      Lock jaw     Past Medical History:   Diagnosis Date    Arthritis pain of hand 11/9/2020    Dermatitis      Past Surgical History:   Procedure Laterality Date    DILATION AND CURETTAGE OF UTERUS      TONSILLECTOMY       MEDS:   Current Outpatient Medications on File Prior to Visit   Medication Sig Dispense Refill    ALA-HIST IR 2 mg Tab Take 1 tablet by mouth every 6 (six) hours as needed.      azelastine (ASTELIN) 137 mcg (0.1 %) nasal spray SMARTSIG:Both Nares      clobetasol (CLOBEX) 0.05 % topical spray APPLY TO SKIN TWICE DAILY AS NEEDED FOR ITCHING. USE SPARINGLY, CAN CAUSE THINNING OF SKIN  0    fexofenadine (ALLEGRA) 180 MG tablet Take 1 tablet (180 mg total) by mouth once daily. 30 tablet 0    fluticasone propionate (FLONASE) 50 mcg/actuation nasal spray 1 spray (50 mcg total) by Each Nostril route once daily. 18.2 mL 0    meloxicam (MOBIC) 7.5 MG tablet TAKE 1 TABLET(7.5 MG) BY mouth daily as needed for wrist pain 30 tablet 2    albuterol (VENTOLIN HFA) 90 mcg/actuation inhaler Inhale 2 puffs into the lungs every 6 (six) hours as needed for Wheezing. Rescue (Patient not taking: Reported on 7/14/2021) 18 g 0    pulse oximeter (PULSE OXIMETER) device Apply Externally route 2 (two) times a day. Use twice  daily at 8 AM and 3 PM and record the value in MyChart as directed. (Patient not taking: Reported on 2023) 1 each 0     Current Facility-Administered Medications on File Prior to Visit   Medication Dose Route Frequency Provider Last Rate Last Admin    acetaminophen tablet 650 mg  650 mg Oral Once PRN Meli Bond PA-C        albuterol inhaler 2 puff  2 puff Inhalation Q20 Min PRN Meli Bond PA-C        diphenhydrAMINE injection 25 mg  25 mg Intravenous Once PRN Meli Bond PA-C        EPINEPHrine 0.1 mg/mL injection 0.3 mg  0.3 mg Intramuscular PRN Meli Bond PA-C        methylPREDNISolone sodium succinate injection 40 mg  40 mg Intravenous Once PRN Meli Bond PA-C        ondansetron disintegrating tablet 4 mg  4 mg Oral Once PRN Meli Bond PA-C        sodium chloride 0.9% 500 mL flush bag   Intravenous PRN Meli Bond PA-C   Stopped at 20 1620    sodium chloride 0.9% flush 10 mL  10 mL Intravenous PRN Meli Bond PA-C         OB History          3    Para   2    Term   2            AB   1    Living   2         SAB   1    IAB        Ectopic        Multiple        Live Births   2               Social History     Socioeconomic History    Marital status:    Tobacco Use    Smoking status: Never    Smokeless tobacco: Never   Substance and Sexual Activity    Alcohol use: Yes     Alcohol/week: 14.0 standard drinks     Types: 14 Glasses of wine per week    Drug use: No    Sexual activity: Yes     Partners: Male     Birth control/protection: Post-menopausal     Family History   Problem Relation Age of Onset    Glaucoma Mother     Lung cancer Father     Cancer Other     Uterine cancer Maternal Grandmother     Ovarian cancer Maternal Grandmother 70    Blindness Neg Hx     Cataracts Neg Hx     Diabetes Neg Hx     Breast cancer Neg Hx     Colon cancer Neg Hx     Macular degeneration Neg Hx     Retinal detachment Neg Hx        Past medical and surgical history  "reviewed.   I have reviewed the patient's medical history in detail and updated the computerized patient record.    Review of System:   General: no chills, fever, night sweats, weight gain or weight loss  Breasts: no new or changing breast lumps, nipple discharge or masses.  Gastrointestinal: no abdominal pain, change in bowel habits, or black or bloody stools  Genito-Urinary: no incontinence, urinary frequency/urgency or vulvar/vaginal symptoms, pelvic pain or abnormal vaginal bleeding.    Physical Exam:   /74   Ht 5' 1" (1.549 m)   Wt 48.4 kg (106 lb 11.2 oz)   BMI 20.16 kg/m²   Constitutional: She appears alert and responsive. She appears well-developed, well-groomed, and well-nourished. No distress.  Breasts: are symmetrical.  Right breast exhibits no inverted nipple, no mass, no nipple discharge, no skin change and no tenderness.  Left breast exhibits no inverted nipple, no mass, no nipple discharge, no skin change and no tenderness.  Abdominal: Soft. She exhibits no distension, hernias or masses. There is no tenderness. No enlargement of liver edge or spleen.  There is no rebound and no guarding.   Genitourinary: Deferred. Pap not indicated and no GYN complaints. Offered patient exam, patient declined exam.     Assessment/Plan:   Encounter for gynecological examination (general) (routine) without abnormal findings    Breast cancer screening by mammogram  -     Mammo Digital Screening Bilat w/ Emiliano; Future; Expected date: 01/24/2023      Follow up in 1 year.      "

## 2024-01-12 ENCOUNTER — PATIENT MESSAGE (OUTPATIENT)
Dept: FAMILY MEDICINE | Facility: CLINIC | Age: 74
End: 2024-01-12
Payer: MEDICARE

## 2024-01-12 ENCOUNTER — TELEPHONE (OUTPATIENT)
Dept: OBSTETRICS AND GYNECOLOGY | Facility: CLINIC | Age: 74
End: 2024-01-12
Payer: MEDICARE

## 2024-01-12 DIAGNOSIS — E78.2 MIXED HYPERLIPIDEMIA: Primary | ICD-10-CM

## 2024-01-12 NOTE — TELEPHONE ENCOUNTER
----- Message from Gaby Jung sent at 1/11/2024  3:02 PM CST -----  Type: Appointment Request    Caller is requesting appointment.      Name of Caller:pt    When is the first available appointment?na    Symptoms:annual/mammo/possible bine density testing    Would the patient rather a call back or a response via [facility]? Call back    Best Call Back Number:[phone]      Additional Information: would like them all scheduled together     Please call Back to advise. Thanks!

## 2024-01-12 NOTE — TELEPHONE ENCOUNTER
LM for patient to return call to office. Scheduled for soonest available appointment for annual, March 4th at 11am

## 2024-01-13 NOTE — TELEPHONE ENCOUNTER
Patient is requesting lab.  Mrs. Meneses was sent a link to schedule an office visit. She have not been seen since 2022.

## 2024-02-27 DIAGNOSIS — Z00.00 ENCOUNTER FOR MEDICARE ANNUAL WELLNESS EXAM: ICD-10-CM

## 2024-03-04 ENCOUNTER — OFFICE VISIT (OUTPATIENT)
Dept: OBSTETRICS AND GYNECOLOGY | Facility: CLINIC | Age: 74
End: 2024-03-04
Payer: MEDICARE

## 2024-03-04 ENCOUNTER — HOSPITAL ENCOUNTER (OUTPATIENT)
Dept: RADIOLOGY | Facility: HOSPITAL | Age: 74
Discharge: HOME | End: 2024-03-04
Attending: OBSTETRICS & GYNECOLOGY
Payer: MEDICARE

## 2024-03-04 VITALS
WEIGHT: 104.94 LBS | DIASTOLIC BLOOD PRESSURE: 60 MMHG | BODY MASS INDEX: 19.81 KG/M2 | HEIGHT: 61 IN | SYSTOLIC BLOOD PRESSURE: 102 MMHG

## 2024-03-04 DIAGNOSIS — Z12.31 BREAST CANCER SCREENING BY MAMMOGRAM: ICD-10-CM

## 2024-03-04 DIAGNOSIS — Z01.419 ENCOUNTER FOR GYNECOLOGICAL EXAMINATION (GENERAL) (ROUTINE) WITHOUT ABNORMAL FINDINGS: Primary | ICD-10-CM

## 2024-03-04 PROCEDURE — 99213 OFFICE O/P EST LOW 20 MIN: CPT | Mod: PBBFAC,PN,25 | Performed by: OBSTETRICS & GYNECOLOGY

## 2024-03-04 PROCEDURE — G0101 CA SCREEN;PELVIC/BREAST EXAM: HCPCS | Mod: S$PBB,,, | Performed by: OBSTETRICS & GYNECOLOGY

## 2024-03-04 PROCEDURE — 77067 SCR MAMMO BI INCL CAD: CPT | Mod: TC,PN

## 2024-03-04 PROCEDURE — 99999 PR PBB SHADOW E&M-EST. PATIENT-LVL III: CPT | Mod: PBBFAC,,, | Performed by: OBSTETRICS & GYNECOLOGY

## 2024-03-04 PROCEDURE — 77063 BREAST TOMOSYNTHESIS BI: CPT | Mod: 26,,, | Performed by: RADIOLOGY

## 2024-03-04 PROCEDURE — 77067 SCR MAMMO BI INCL CAD: CPT | Mod: 26,,, | Performed by: RADIOLOGY

## 2024-03-04 PROCEDURE — G0101 CA SCREEN;PELVIC/BREAST EXAM: HCPCS | Mod: PBBFAC,PN | Performed by: OBSTETRICS & GYNECOLOGY

## 2024-03-04 NOTE — PROGRESS NOTES
Chief Complaint   Patient presents with    Well Woman       History and Physical:  Zaira Bansal is a 73 y.o. F who presents today for her routine annual GYN exam.     Annual:   No LMP recorded. Patient is postmenopausal.  Last Pap: 3/2018 NILM  History of abnormal pap: never  Last Mammogram: 1/2023 BIRADS 1; Mauricioer-Luzmack risk 2.3%: next 1/2024  Colonosocpy: years ago normal per patient; next 1/2025  DEXA: 1/2022 Osteopenia; next 1/2025   PCP: Kishore Marie MD  Orders routine labs 1/2024  Immunization status: stated as current, but no records available. S/p Zoster  Body mass index is 19.83 kg/m².    Allergies:   Review of patient's allergies indicates:   Allergen Reactions    Compazine [prochlorperazine edisylate]      Lock jaw     Past Medical History:   Diagnosis Date    Arthritis pain of hand 11/9/2020    Dermatitis      Past Surgical History:   Procedure Laterality Date    DILATION AND CURETTAGE OF UTERUS      TONSILLECTOMY       MEDS:   Current Outpatient Medications on File Prior to Visit   Medication Sig Dispense Refill    ALA-HIST IR 2 mg Tab Take 1 tablet by mouth every 6 (six) hours as needed.      azelastine (ASTELIN) 137 mcg (0.1 %) nasal spray SMARTSIG:Both Nares      clobetasol (CLOBEX) 0.05 % topical spray APPLY TO SKIN TWICE DAILY AS NEEDED FOR ITCHING. USE SPARINGLY, CAN CAUSE THINNING OF SKIN  0    fluticasone propionate (FLONASE) 50 mcg/actuation nasal spray 1 spray (50 mcg total) by Each Nostril route once daily. 18.2 mL 0    meloxicam (MOBIC) 7.5 MG tablet TAKE 1 TABLET(7.5 MG) BY mouth daily as needed for wrist pain 30 tablet 2    albuterol (VENTOLIN HFA) 90 mcg/actuation inhaler Inhale 2 puffs into the lungs every 6 (six) hours as needed for Wheezing. Rescue (Patient not taking: Reported on 7/14/2021) 18 g 0    fexofenadine (ALLEGRA) 180 MG tablet Take 1 tablet (180 mg total) by mouth once daily. 30 tablet 0    pulse oximeter (PULSE OXIMETER) device Apply Externally route 2 (two) times a  day. Use twice daily at 8 AM and 3 PM and record the value in MyChart as directed. (Patient not taking: Reported on 3/4/2024) 1 each 0     Current Facility-Administered Medications on File Prior to Visit   Medication Dose Route Frequency Provider Last Rate Last Admin    acetaminophen tablet 650 mg  650 mg Oral Once PRN Meli Bond, PA-C        albuterol inhaler 2 puff  2 puff Inhalation Q20 Min PRN Meli Bond, PA-C        diphenhydrAMINE injection 25 mg  25 mg Intravenous Once PRN Meli Bond, PA-C        EPINEPHrine 0.1 mg/mL injection 0.3 mg  0.3 mg Intramuscular PRN Meli Bond, PA-C        methylPREDNISolone sodium succinate injection 40 mg  40 mg Intravenous Once PRN Meli Bond, PA-C        ondansetron disintegrating tablet 4 mg  4 mg Oral Once PRN Meli Bond, PA-C        sodium chloride 0.9% 500 mL flush bag   Intravenous PRN Meli Bond, PA-C   Stopped at 20 1620    sodium chloride 0.9% flush 10 mL  10 mL Intravenous PRN Meli Bond PA-C         OB History          3    Para   2    Term   2            AB   1    Living   2         SAB   1    IAB        Ectopic        Multiple        Live Births   2               Social History     Socioeconomic History    Marital status:    Tobacco Use    Smoking status: Never    Smokeless tobacco: Never   Substance and Sexual Activity    Alcohol use: Yes     Alcohol/week: 14.0 standard drinks of alcohol     Types: 14 Glasses of wine per week    Drug use: No    Sexual activity: Yes     Partners: Male     Birth control/protection: Post-menopausal     Social Determinants of Health     Financial Resource Strain: Low Risk  (2020)    Overall Financial Resource Strain (CARDIA)     Difficulty of Paying Living Expenses: Not hard at all   Food Insecurity: No Food Insecurity (2020)    Hunger Vital Sign     Worried About Running Out of Food in the Last Year: Never true     Ran Out of Food in the Last Year: Never  "true   Transportation Needs: No Transportation Needs (8/31/2020)    PRAPARE - Transportation     Lack of Transportation (Medical): No     Lack of Transportation (Non-Medical): No   Physical Activity: Sufficiently Active (8/31/2020)    Exercise Vital Sign     Days of Exercise per Week: 7 days     Minutes of Exercise per Session: 60 min   Stress: No Stress Concern Present (8/31/2020)    [employer] - Occupational Stress Questionnaire     Feeling of Stress : Not at all     Family History   Problem Relation Age of Onset    Glaucoma Mother     Lung cancer Father     Uterine cancer Maternal Grandmother     Ovarian cancer Maternal Grandmother 70    Cancer Other     Blindness Neg Hx     Cataracts Neg Hx     Diabetes Neg Hx     Breast cancer Neg Hx     Colon cancer Neg Hx     Macular degeneration Neg Hx     Retinal detachment Neg Hx        Past medical and surgical history reviewed.   I have reviewed the patient's medical history in detail and updated the computerized patient record.    Review of System:   General: no chills, fever, night sweats, weight gain or weight loss  Breasts: no new or changing breast lumps, nipple discharge or masses.  Gastrointestinal: no abdominal pain, change in bowel habits, or black or bloody stools  Genito-Urinary: no incontinence, urinary frequency/urgency or vulvar/vaginal symptoms, pelvic pain or abnormal vaginal bleeding.    Physical Exam:   /60   Ht 5' 1" (1.549 m)   Wt 47.6 kg (104 lb 15 oz)   BMI 19.83 kg/m²   Constitutional: She appears alert and responsive. She appears well-developed, well-groomed, and well-nourished. No distress.  Breasts: are symmetrical.  Right breast exhibits no inverted nipple, no mass, no nipple discharge, no skin change and no tenderness.  Left breast exhibits no inverted nipple, no mass, no nipple discharge, no skin change and no tenderness.  Abdominal: Soft. She exhibits no distension, hernias or masses. There is no " tenderness. No enlargement of liver edge or spleen.  There is no rebound and no guarding.   Genitourinary: Deferred. Pap not indicated and no GYN complaints. Offered patient exam, patient declined exam.     Assessment/Plan:   Encounter for gynecological examination (general) (routine) without abnormal findings    Breast cancer screening by mammogram  -     Mammo Digital Screening Bilat w/ Emiliano; Future; Expected date: 03/04/2024      Follow up in 1 year.

## 2024-03-05 ENCOUNTER — OFFICE VISIT (OUTPATIENT)
Dept: FAMILY MEDICINE | Facility: CLINIC | Age: 74
End: 2024-03-05
Payer: MEDICARE

## 2024-03-05 VITALS
HEART RATE: 68 BPM | OXYGEN SATURATION: 97 % | HEIGHT: 61 IN | WEIGHT: 103.63 LBS | SYSTOLIC BLOOD PRESSURE: 112 MMHG | DIASTOLIC BLOOD PRESSURE: 62 MMHG | BODY MASS INDEX: 19.57 KG/M2

## 2024-03-05 DIAGNOSIS — E78.2 MIXED HYPERLIPIDEMIA: ICD-10-CM

## 2024-03-05 DIAGNOSIS — M85.852 OSTEOPENIA OF BOTH HIPS: ICD-10-CM

## 2024-03-05 DIAGNOSIS — Z00.00 WELLNESS EXAMINATION: Primary | ICD-10-CM

## 2024-03-05 DIAGNOSIS — M85.851 OSTEOPENIA OF BOTH HIPS: ICD-10-CM

## 2024-03-05 DIAGNOSIS — M77.8 LEFT WRIST TENDONITIS: ICD-10-CM

## 2024-03-05 PROBLEM — R47.81 SLURRED SPEECH: Status: RESOLVED | Noted: 2021-01-05 | Resolved: 2024-03-05

## 2024-03-05 PROCEDURE — 99214 OFFICE O/P EST MOD 30 MIN: CPT | Mod: S$PBB,,, | Performed by: INTERNAL MEDICINE

## 2024-03-05 PROCEDURE — 90677 PCV20 VACCINE IM: CPT | Mod: PBBFAC,PO

## 2024-03-05 PROCEDURE — 99213 OFFICE O/P EST LOW 20 MIN: CPT | Mod: PBBFAC,PO,25 | Performed by: INTERNAL MEDICINE

## 2024-03-05 PROCEDURE — 99999PBSHW PNEUMOCOCCAL CONJUGATE VACCINE 20-VALENT: Mod: PBBFAC,,,

## 2024-03-05 PROCEDURE — 99999 PR PBB SHADOW E&M-EST. PATIENT-LVL III: CPT | Mod: PBBFAC,,, | Performed by: INTERNAL MEDICINE

## 2024-03-05 RX ORDER — MELOXICAM 7.5 MG/1
7.5 TABLET ORAL DAILY
Qty: 90 TABLET | Refills: 3 | Status: SHIPPED | OUTPATIENT
Start: 2024-03-05 | End: 2025-03-05

## 2024-03-05 NOTE — PROGRESS NOTES
"[facility]  Primary Care   [address]       Patient ID: Zaira Bansal     Chief Complaint:   Chief Complaint   Patient presents with    Annual Exam        HPI:  Annual exam and overall I think she is doing very well.  It has been quite a few years since I last saw her but she has been no good.  She has no new surgeries or medical problems or family history to discuss.  She still does have her chronic bilateral but worse in the left hand arthritis that is quite pronounced of the saddle joint.  She requests a refill of meloxicam 7.5 mg I would like her to take it daily or twice daily but only as needed.  I want her to consider a referral to our hand surgeon for a steroid injection if she feels the need and she can contact me for that.  Vital signs look very good.  She is up-to-date with all her health maintenance and I will give her a Prevnar 20 today to complete her pneumonia prophylaxis.  I do recommend that she get her RSV vaccine at a later date from her local pharmacy.    Review of Systems       Hand pain     Objective:      Physical Exam   Physical Exam       Hand osteoarthritis - worse on the Left hand     Vitals:   Vitals:    03/05/24 1557   BP: 112/62   Pulse: 68   SpO2: 97%   Weight: 47 kg (103 lb 9.9 oz)   Height: 5' 1" (1.549 m)        Assessment:           Plan:       Zaira Bansal  was seen today for follow-up and may need lab work.    Diagnoses and all orders for this visit:    Zaira was seen today for annual exam.    Diagnoses and all orders for this visit:    Wellness examination    Osteopenia of both hips  Continue calcium and Vit D and Monitor DEXA    Left wrist tendonitis  -     meloxicam (MOBIC) 7.5 MG tablet; Take 1 tablet (7.5 mg total) by mouth once daily. TAKE 1 TABLET(7.5 MG) BY mouth daily as needed for wrist pain  Controlled with med   Consider hand surgery for steroid injection     Mixed hyperlipidemia  Controlled     Other orders  -     (In Office " Administered) Pneumococcal Conjugate Vaccine (20 Valent) (IM) (Preferred)           Kishore Marie MD

## 2024-04-18 ENCOUNTER — OFFICE VISIT (OUTPATIENT)
Dept: OPTOMETRY | Facility: CLINIC | Age: 74
End: 2024-04-18
Payer: MEDICARE

## 2024-04-18 DIAGNOSIS — H25.13 NUCLEAR SCLEROSIS OF BOTH EYES: Primary | ICD-10-CM

## 2024-04-18 DIAGNOSIS — H52.7 REFRACTIVE ERROR: ICD-10-CM

## 2024-04-18 DIAGNOSIS — H04.123 BILATERAL DRY EYES: ICD-10-CM

## 2024-04-18 PROCEDURE — 92014 COMPRE OPH EXAM EST PT 1/>: CPT | Mod: S$PBB,,, | Performed by: OPTOMETRIST

## 2024-04-18 PROCEDURE — 99212 OFFICE O/P EST SF 10 MIN: CPT | Mod: PBBFAC,PO | Performed by: OPTOMETRIST

## 2024-04-18 PROCEDURE — 99999 PR PBB SHADOW E&M-EST. PATIENT-LVL II: CPT | Mod: PBBFAC,,, | Performed by: OPTOMETRIST

## 2024-04-18 PROCEDURE — 92015 DETERMINE REFRACTIVE STATE: CPT | Mod: ,,, | Performed by: OPTOMETRIST

## 2024-04-18 NOTE — PROGRESS NOTES
HPI     Concerns About Ocular Health     Additional comments: Ocular health exam           Comments    DLS: 12/2/22    Pt states no va complaints at this time. No floaters or flashes.     Gtts: Systane BID OU          Last edited by Quintana, Cheryle on 4/18/2024  2:07 PM.            Assessment /Plan     For exam results, see Encounter Report.    Nuclear sclerosis of both eyes    Bilateral dry eyes    Refractive error      Educated pt on presence of cataracts and effects on vision. No surgery at this time. Recheck in one year.  2. Recommend artificial tears. 1 drop 2x per day. Chronicity of disease and treatment discussed.  3. New Spectacle Rx given, discussed different options for glasses. RTC 1 year routine eye exam.

## 2024-06-13 ENCOUNTER — OFFICE VISIT (OUTPATIENT)
Dept: FAMILY MEDICINE | Facility: CLINIC | Age: 74
End: 2024-06-13
Payer: MEDICARE

## 2024-06-13 VITALS
HEIGHT: 61 IN | OXYGEN SATURATION: 100 % | BODY MASS INDEX: 19.43 KG/M2 | HEART RATE: 76 BPM | WEIGHT: 102.88 LBS | SYSTOLIC BLOOD PRESSURE: 120 MMHG | DIASTOLIC BLOOD PRESSURE: 70 MMHG

## 2024-06-13 DIAGNOSIS — J01.90 ACUTE BACTERIAL SINUSITIS: Primary | ICD-10-CM

## 2024-06-13 DIAGNOSIS — B96.89 ACUTE BACTERIAL SINUSITIS: Primary | ICD-10-CM

## 2024-06-13 DIAGNOSIS — J20.9 ACUTE BRONCHITIS, UNSPECIFIED ORGANISM: ICD-10-CM

## 2024-06-13 DIAGNOSIS — G47.30 SLEEP-DISORDERED BREATHING: ICD-10-CM

## 2024-06-13 PROCEDURE — 99999 PR PBB SHADOW E&M-EST. PATIENT-LVL IV: CPT | Mod: PBBFAC,,, | Performed by: PHYSICIAN ASSISTANT

## 2024-06-13 PROCEDURE — 99214 OFFICE O/P EST MOD 30 MIN: CPT | Mod: PBBFAC,PO | Performed by: PHYSICIAN ASSISTANT

## 2024-06-13 PROCEDURE — 99214 OFFICE O/P EST MOD 30 MIN: CPT | Mod: S$PBB,,, | Performed by: PHYSICIAN ASSISTANT

## 2024-06-13 RX ORDER — AMOXICILLIN AND CLAVULANATE POTASSIUM 875; 125 MG/1; MG/1
1 TABLET, FILM COATED ORAL 2 TIMES DAILY
Qty: 10 TABLET | Refills: 0 | Status: SHIPPED | OUTPATIENT
Start: 2024-06-13 | End: 2024-06-18

## 2024-06-13 RX ORDER — BENZONATATE 100 MG/1
100 CAPSULE ORAL 3 TIMES DAILY PRN
Qty: 30 CAPSULE | Refills: 0 | Status: SHIPPED | OUTPATIENT
Start: 2024-06-13 | End: 2024-06-23

## 2024-06-13 NOTE — PATIENT INSTRUCTIONS
A few reminders from today:    Continue alahist. Once you run out, start Allegra daily  Flonase nasal spray daily  Augmentin as prescribed  Tessalon as needed for cough  Cough drops as needed  Honey lemon tea  Hot steamy showers/warm compresses over sinuses  Rest, hydrate, eat healthy  Tylenol as needed for discomfort  Take daily multivitamin, eat plenty of protein to help with strength and healing  Warm salt water gargles as needed for throat irritation  Home sleep study ordered, they will contact you    Follow up with me if needed.   Please go to ER/urgent care if after hours or symptoms persist/worsen.     Do not hesitate to get in touch with me should you have any further questions.     Thank you for trusting me with your care!  I wish you health and happiness.    -Meli Bond PA-C

## 2024-06-13 NOTE — PROGRESS NOTES
Subjective     Patient ID: Zaira Bansal is a 73 y.o. female.    Chief Complaint: Cough (Started 2 weeks ago) and Nasal Congestion (Started 2 weeks ago )    Pt is known to me, PCP Dr. Marie.     Pt is a 73 year old female with HLD, osteopenia. She presents today complaining of cough and congestion x 2 weeks. Never had fever, chills, body aches. No GI upset. Went to urgent care after about 5 days of symptoms and was given an antihistamine, doxycycline, and prednisone. The cough improved, but still present. Cough is nonproductive. Still very congested with sinus pressure.     Pt also complains of snoring, interrupted sleep from the snoring or even episodes of apnea. Has tried mouth taping, nasal clips etc and nothing seems to help. Wonders if she needs a sleep test.     Review of Systems   Constitutional:  Negative for chills and fever.   HENT:  Positive for nasal congestion, rhinorrhea and sinus pressure/congestion. Negative for ear pain, postnasal drip and sore throat.    Respiratory:  Positive for cough. Negative for shortness of breath and wheezing.    Cardiovascular:  Negative for chest pain.   Musculoskeletal:  Negative for myalgias.   Integumentary:  Negative for rash.   Allergic/Immunologic: Negative for environmental allergies.   Neurological:  Negative for headaches.          Objective     Physical Exam  Constitutional:       General: She is not in acute distress.     Appearance: Normal appearance. She is not ill-appearing, toxic-appearing or diaphoretic.   HENT:      Head: Normocephalic and atraumatic.      Right Ear: Tympanic membrane, ear canal and external ear normal. There is no impacted cerumen.      Left Ear: Tympanic membrane, ear canal and external ear normal. There is no impacted cerumen.      Nose: Congestion present. No rhinorrhea.      Comments: Tenderness maxillary sinuses     Mouth/Throat:      Mouth: Mucous membranes are moist.      Pharynx: Oropharynx is clear. No oropharyngeal exudate or  posterior oropharyngeal erythema.   Eyes:      General: No scleral icterus.        Right eye: No discharge.         Left eye: No discharge.      Extraocular Movements: Extraocular movements intact.      Conjunctiva/sclera: Conjunctivae normal.      Pupils: Pupils are equal, round, and reactive to light.   Cardiovascular:      Heart sounds: Normal heart sounds.   Pulmonary:      Effort: No respiratory distress.      Breath sounds: Normal breath sounds.   Neurological:      General: No focal deficit present.      Mental Status: She is alert and oriented to person, place, and time.   Psychiatric:         Mood and Affect: Mood normal.         Behavior: Behavior normal.         Thought Content: Thought content normal.         Judgment: Judgment normal.       1. Acute bacterial sinusitis  - amoxicillin-clavulanate 875-125mg (AUGMENTIN) 875-125 mg per tablet; Take 1 tablet by mouth 2 (two) times daily. for 5 days  Dispense: 10 tablet; Refill: 0    2. Acute bronchitis, unspecified organism  -symptomatic measures discussed  - benzonatate (TESSALON PERLES) 100 MG capsule; Take 1 capsule (100 mg total) by mouth 3 (three) times daily as needed for Cough.  Dispense: 30 capsule; Refill: 0    3. Sleep-disordered breathing  - Home Sleep Study; Future      RTC/ER precautions given. F/U if symptoms persist or worsen    Meli Bond PA-C

## 2024-07-31 PROBLEM — G47.33 OSA (OBSTRUCTIVE SLEEP APNEA): Status: ACTIVE | Noted: 2024-07-31

## 2025-01-31 ENCOUNTER — PATIENT MESSAGE (OUTPATIENT)
Facility: CLINIC | Age: 75
End: 2025-01-31
Payer: MEDICARE

## 2025-02-21 DIAGNOSIS — Z00.00 ENCOUNTER FOR MEDICARE ANNUAL WELLNESS EXAM: ICD-10-CM

## 2025-02-22 ENCOUNTER — PATIENT MESSAGE (OUTPATIENT)
Dept: FAMILY MEDICINE | Facility: CLINIC | Age: 75
End: 2025-02-22
Payer: MEDICARE

## 2025-03-05 DIAGNOSIS — Z78.0 MENOPAUSE: ICD-10-CM

## 2025-03-12 ENCOUNTER — HOSPITAL ENCOUNTER (OUTPATIENT)
Dept: RADIOLOGY | Facility: HOSPITAL | Age: 75
Discharge: HOME | End: 2025-03-12
Attending: INTERNAL MEDICINE
Payer: MEDICARE

## 2025-03-12 ENCOUNTER — PATIENT MESSAGE (OUTPATIENT)
Dept: FAMILY MEDICINE | Facility: CLINIC | Age: 75
End: 2025-03-12
Payer: MEDICARE

## 2025-03-12 ENCOUNTER — OFFICE VISIT (OUTPATIENT)
Dept: OBSTETRICS AND GYNECOLOGY | Facility: CLINIC | Age: 75
End: 2025-03-12
Payer: MEDICARE

## 2025-03-12 VITALS
HEIGHT: 61 IN | BODY MASS INDEX: 19.32 KG/M2 | DIASTOLIC BLOOD PRESSURE: 58 MMHG | WEIGHT: 102.31 LBS | SYSTOLIC BLOOD PRESSURE: 106 MMHG

## 2025-03-12 DIAGNOSIS — Z13.820 SCREENING FOR OSTEOPOROSIS: ICD-10-CM

## 2025-03-12 DIAGNOSIS — Z12.31 ENCOUNTER FOR SCREENING MAMMOGRAM FOR BREAST CANCER: ICD-10-CM

## 2025-03-12 DIAGNOSIS — Z12.11 SCREENING FOR COLON CANCER: ICD-10-CM

## 2025-03-12 DIAGNOSIS — Z00.00 PREVENTATIVE HEALTH CARE: Primary | ICD-10-CM

## 2025-03-12 DIAGNOSIS — Z78.0 MENOPAUSE: ICD-10-CM

## 2025-03-12 DIAGNOSIS — Z01.419 ENCOUNTER FOR GYNECOLOGICAL EXAMINATION (GENERAL) (ROUTINE) WITHOUT ABNORMAL FINDINGS: Primary | ICD-10-CM

## 2025-03-12 DIAGNOSIS — E78.2 MIXED HYPERLIPIDEMIA: ICD-10-CM

## 2025-03-12 PROCEDURE — 77063 BREAST TOMOSYNTHESIS BI: CPT | Mod: TC,PN

## 2025-03-12 PROCEDURE — 77067 SCR MAMMO BI INCL CAD: CPT | Mod: 26,,, | Performed by: RADIOLOGY

## 2025-03-12 PROCEDURE — 99213 OFFICE O/P EST LOW 20 MIN: CPT | Mod: PBBFAC,PN | Performed by: OBSTETRICS & GYNECOLOGY

## 2025-03-12 PROCEDURE — G0101 CA SCREEN;PELVIC/BREAST EXAM: HCPCS | Mod: S$PBB,,, | Performed by: OBSTETRICS & GYNECOLOGY

## 2025-03-12 PROCEDURE — 99999 PR PBB SHADOW E&M-EST. PATIENT-LVL III: CPT | Mod: PBBFAC,,, | Performed by: OBSTETRICS & GYNECOLOGY

## 2025-03-12 PROCEDURE — 77063 BREAST TOMOSYNTHESIS BI: CPT | Mod: 26,,, | Performed by: RADIOLOGY

## 2025-03-12 PROCEDURE — G0101 CA SCREEN;PELVIC/BREAST EXAM: HCPCS | Mod: PBBFAC,PN | Performed by: OBSTETRICS & GYNECOLOGY

## 2025-03-12 NOTE — PROGRESS NOTES
Chief Complaint   Patient presents with    Well Woman       History and Physical:  Zaira Bansal is a 74 y.o. F who presents today for her routine annual GYN exam.     Annual:   No LMP recorded. Patient is postmenopausal.  Last Pap: 3/2018 NILM  History of abnormal pap: never, paps no longer indicated.   Last Mammogram: 3/2024 BIRADS 2; Tyrer-Cuzick risk 2%; next 3/2025  Colonosocpy: years ago normal per patient; next 1/2025  DEXA: 1/2022 Osteopenia; next 1/2025   PCP: Kishore Marie MD  Orders routine labs 1/2024  Immunization status: stated as current, but no records available. S/p Zoster  Body mass index is 19.33 kg/m².    Allergies:   Review of patient's allergies indicates:   Allergen Reactions    Compazine [prochlorperazine edisylate]      Lock jaw     Past Medical History:   Diagnosis Date    Arthritis pain of hand 11/09/2020    Cataract     Dermatitis     Slurred speech 01/05/2021     Past Surgical History:   Procedure Laterality Date    DILATION AND CURETTAGE OF UTERUS      TONSILLECTOMY       MEDS:   Current Outpatient Medications on File Prior to Visit   Medication Sig Dispense Refill    ALA-HIST IR 2 mg Tab Take 1 tablet by mouth every 6 (six) hours as needed.      clobetasol (CLOBEX) 0.05 % topical spray APPLY TO SKIN TWICE DAILY AS NEEDED FOR ITCHING. USE SPARINGLY, CAN CAUSE THINNING OF SKIN  0    fexofenadine (ALLEGRA) 180 MG tablet Take 1 tablet (180 mg total) by mouth once daily. 30 tablet 0    fluticasone propionate (FLONASE) 50 mcg/actuation nasal spray 1 spray (50 mcg total) by Each Nostril route once daily. 18.2 mL 0    albuterol (VENTOLIN HFA) 90 mcg/actuation inhaler Inhale 2 puffs into the lungs every 6 (six) hours as needed for Wheezing. Rescue (Patient not taking: Reported on 3/12/2025) 18 g 0    azelastine (ASTELIN) 137 mcg (0.1 %) nasal spray SMARTSIG:Both Nares (Patient not taking: Reported on 3/12/2025)      pulse oximeter (PULSE OXIMETER) device Apply Externally route 2 (two)  times a day. Use twice daily at 8 AM and 3 PM and record the value in MyChart as directed. (Patient not taking: Reported on 3/12/2025) 1 each 0     No current facility-administered medications on file prior to visit.     OB History          3    Para   2    Term   2            AB   1    Living   2         SAB   1    IAB        Ectopic        Multiple        Live Births   2               Social History     Socioeconomic History    Marital status:    Tobacco Use    Smoking status: Never    Smokeless tobacco: Never   Substance and Sexual Activity    Alcohol use: Yes     Alcohol/week: 14.0 standard drinks of alcohol     Types: 14 Glasses of wine per week    Drug use: No    Sexual activity: Yes     Partners: Male     Birth control/protection: Post-menopausal     Social Drivers of Health     Financial Resource Strain: Low Risk  (3/4/2024)    Overall Financial Resource Strain (CARDIA)     Difficulty of Paying Living Expenses: Not hard at all   Food Insecurity: No Food Insecurity (3/4/2024)    Hunger Vital Sign     Worried About Running Out of Food in the Last Year: Never true     Ran Out of Food in the Last Year: Never true   Transportation Needs: No Transportation Needs (3/4/2024)    PRAPARE - Transportation     Lack of Transportation (Medical): No     Lack of Transportation (Non-Medical): No   Physical Activity: Sufficiently Active (3/4/2024)    Exercise Vital Sign     Days of Exercise per Week: 6 days     Minutes of Exercise per Session: 50 min   Stress: No Stress Concern Present (3/4/2024)    [employer] - Occupational Stress Questionnaire     Feeling of Stress : Not at all   Housing Stability: Low Risk  (3/4/2024)    Housing Stability Vital Sign     Unable to Pay for Housing in the Last Year: No     Number of Places Lived in the Last Year: 1     Unstable Housing in the Last Year: No     Family History   Problem Relation Name Age of Onset    Glaucoma Mother      Lung  "cancer Father      No Known Problems Sister      No Known Problems Brother      Uterine cancer Maternal Grandmother      Ovarian cancer Maternal Grandmother  70    Cancer Other      Blindness Neg Hx      Cataracts Neg Hx      Diabetes Neg Hx      Breast cancer Neg Hx      Colon cancer Neg Hx      Macular degeneration Neg Hx      Retinal detachment Neg Hx         Past medical and surgical history reviewed.   I have reviewed the patient's medical history in detail and updated the computerized patient record.    Review of System:   General: no chills, fever, night sweats, weight gain or weight loss  Breasts: no new or changing breast lumps, nipple discharge or masses.  Gastrointestinal: no abdominal pain, change in bowel habits, or black or bloody stools  Genito-Urinary: no incontinence, urinary frequency/urgency or vulvar/vaginal symptoms, pelvic pain or abnormal vaginal bleeding.    Physical Exam:   BP (!) 106/58   Ht 5' 1" (1.549 m)   Wt 46.4 kg (102 lb 4.7 oz)   BMI 19.33 kg/m²   Constitutional: She appears alert and responsive. She appears well-developed, well-groomed, and well-nourished. No distress.  Breasts: are symmetrical.  Right breast exhibits no inverted nipple, no mass, no nipple discharge, no skin change and no tenderness.  Left breast exhibits no inverted nipple, no mass, no nipple discharge, no skin change and no tenderness.  Abdominal: Soft. She exhibits no distension, hernias or masses. There is no tenderness. No enlargement of liver edge or spleen.  There is no rebound and no guarding.   Genitourinary: Deferred. Pap not indicated and no GYN complaints. Offered patient exam, patient declined exam.     Assessment/Plan:   Encounter for gynecological examination (general) (routine) without abnormal findings    Screening for osteoporosis    Encounter for screening mammogram for breast cancer    Menopause    Screening for colon cancer        Follow up in 1 year.    "

## 2025-03-13 ENCOUNTER — RESULTS FOLLOW-UP (OUTPATIENT)
Dept: OBSTETRICS AND GYNECOLOGY | Facility: CLINIC | Age: 75
End: 2025-03-13

## 2025-03-19 DIAGNOSIS — Z12.11 SCREENING FOR COLON CANCER: Primary | ICD-10-CM

## 2025-03-21 ENCOUNTER — TELEPHONE (OUTPATIENT)
Dept: GASTROENTEROLOGY | Facility: CLINIC | Age: 75
End: 2025-03-21
Payer: MEDICARE

## 2025-03-21 NOTE — TELEPHONE ENCOUNTER
Attempted to reach pt to schedule scope from case request. Left VM, call back number provided. [employer] message sent.

## 2025-03-24 ENCOUNTER — TELEPHONE (OUTPATIENT)
Dept: GASTROENTEROLOGY | Facility: CLINIC | Age: 75
End: 2025-03-24
Payer: MEDICARE

## 2025-03-24 NOTE — TELEPHONE ENCOUNTER
----- Message from Tiffany sent at 3/24/2025 11:35 AM CDT -----  Contact: pt [phone]  Type:  Patient Returning CallWho Called:  Pt Who Left Message for Patient:  Acacia Does the patient know what this is regarding?:  yes scheduling COL, Best Call Back Number:  [phone] Information:  Pls call back

## 2025-03-26 ENCOUNTER — HOSPITAL ENCOUNTER (OUTPATIENT)
Dept: RADIOLOGY | Facility: HOSPITAL | Age: 75
Discharge: HOME | End: 2025-03-26
Attending: INTERNAL MEDICINE
Payer: MEDICARE

## 2025-03-26 ENCOUNTER — RESULTS FOLLOW-UP (OUTPATIENT)
Dept: FAMILY MEDICINE | Facility: CLINIC | Age: 75
End: 2025-03-26

## 2025-03-26 DIAGNOSIS — Z78.0 MENOPAUSE: ICD-10-CM

## 2025-03-26 PROCEDURE — 77080 DXA BONE DENSITY AXIAL: CPT | Mod: TC,PO

## 2025-03-26 PROCEDURE — 77080 DXA BONE DENSITY AXIAL: CPT | Mod: 26,,, | Performed by: RADIOLOGY

## 2025-03-26 PROCEDURE — 77092 TBS I&R FX RSK QHP: CPT | Mod: ,,, | Performed by: RADIOLOGY

## 2025-03-27 ENCOUNTER — RESULTS FOLLOW-UP (OUTPATIENT)
Dept: FAMILY MEDICINE | Facility: CLINIC | Age: 75
End: 2025-03-27

## 2025-03-27 ENCOUNTER — PATIENT MESSAGE (OUTPATIENT)
Dept: OBSTETRICS AND GYNECOLOGY | Facility: CLINIC | Age: 75
End: 2025-03-27
Payer: MEDICARE

## 2025-03-27 ENCOUNTER — TELEPHONE (OUTPATIENT)
Dept: OBSTETRICS AND GYNECOLOGY | Facility: CLINIC | Age: 75
End: 2025-03-27
Payer: MEDICARE

## 2025-03-27 ENCOUNTER — LAB VISIT (OUTPATIENT)
Dept: LAB | Facility: HOSPITAL | Age: 75
End: 2025-03-27
Attending: OBSTETRICS & GYNECOLOGY
Payer: MEDICARE

## 2025-03-27 ENCOUNTER — RESULTS FOLLOW-UP (OUTPATIENT)
Dept: OBSTETRICS AND GYNECOLOGY | Facility: CLINIC | Age: 75
End: 2025-03-27

## 2025-03-27 ENCOUNTER — PATIENT MESSAGE (OUTPATIENT)
Dept: FAMILY MEDICINE | Facility: CLINIC | Age: 75
End: 2025-03-27
Payer: MEDICARE

## 2025-03-27 DIAGNOSIS — M85.80 OSTEOPENIA, UNSPECIFIED LOCATION: Primary | ICD-10-CM

## 2025-03-27 DIAGNOSIS — E03.8 SUBCLINICAL HYPOTHYROIDISM: ICD-10-CM

## 2025-03-27 DIAGNOSIS — M85.80 OSTEOPENIA, UNSPECIFIED LOCATION: ICD-10-CM

## 2025-03-27 DIAGNOSIS — R79.89 ELEVATED TSH: Primary | ICD-10-CM

## 2025-03-27 LAB — CA-I BLD-SCNC: 1.28 MMOL/L (ref 1.06–1.42)

## 2025-03-27 PROCEDURE — 36415 COLL VENOUS BLD VENIPUNCTURE: CPT | Mod: PO

## 2025-03-27 PROCEDURE — 82330 ASSAY OF CALCIUM: CPT

## 2025-03-27 PROCEDURE — 82652 VIT D 1 25-DIHYDROXY: CPT

## 2025-03-27 NOTE — TELEPHONE ENCOUNTER
----- Message from Vera Mena MD sent at 3/27/2025  7:32 AM CDT -----  Impression: Low bone mass (Osteopenia) with decreasing bone mineral density. You have a 3.1% risk of hip fracture in the next 10 years. This is an indication to start treatment. Additional lab work   has been ordered.   Medical therapy is recommended to prevent fractures. Usually first line treatment is a bisphosphonate, such as Alendronate.       Your bone density test shows osteoporosis.   Other RECOMMENDATIONS:  #Daily calcium intake 3449-4842 mg, dietary sources preferred  #Vitamin D 1406-1774 IU daily.  #Weight bearing exercise and fall precautions.  #Repeat DEXA in 2 years.  #Consider follow up with your PCP or Endocrinology for additional recommendations.     Vera Mena MD      ----- Message -----  From: Interface, Rad Results In  Sent: 3/26/2025   9:39 AM CDT  To: Vera Mena MD

## 2025-03-30 ENCOUNTER — RESULTS FOLLOW-UP (OUTPATIENT)
Dept: FAMILY MEDICINE | Facility: CLINIC | Age: 75
End: 2025-03-30

## 2025-04-01 ENCOUNTER — OFFICE VISIT (OUTPATIENT)
Dept: FAMILY MEDICINE | Facility: CLINIC | Age: 75
End: 2025-04-01
Payer: MEDICARE

## 2025-04-01 VITALS
BODY MASS INDEX: 19.78 KG/M2 | WEIGHT: 104.75 LBS | OXYGEN SATURATION: 97 % | HEIGHT: 61 IN | HEART RATE: 71 BPM | DIASTOLIC BLOOD PRESSURE: 74 MMHG | SYSTOLIC BLOOD PRESSURE: 114 MMHG

## 2025-04-01 DIAGNOSIS — M85.852 OSTEOPENIA OF BOTH HIPS: ICD-10-CM

## 2025-04-01 DIAGNOSIS — M19.042 ARTHRITIS OF LEFT HAND: ICD-10-CM

## 2025-04-01 DIAGNOSIS — Z00.00 PREVENTATIVE HEALTH CARE: Primary | ICD-10-CM

## 2025-04-01 DIAGNOSIS — E78.2 MIXED HYPERLIPIDEMIA: ICD-10-CM

## 2025-04-01 DIAGNOSIS — M85.851 OSTEOPENIA OF BOTH HIPS: ICD-10-CM

## 2025-04-01 DIAGNOSIS — E03.8 SUBCLINICAL HYPOTHYROIDISM: ICD-10-CM

## 2025-04-01 PROCEDURE — 99213 OFFICE O/P EST LOW 20 MIN: CPT | Mod: PBBFAC,PO | Performed by: PHYSICIAN ASSISTANT

## 2025-04-01 PROCEDURE — 99999 PR PBB SHADOW E&M-EST. PATIENT-LVL III: CPT | Mod: PBBFAC,,, | Performed by: PHYSICIAN ASSISTANT

## 2025-04-01 RX ORDER — MELOXICAM 7.5 MG/1
7.5 TABLET ORAL DAILY
Qty: 90 TABLET | Refills: 3 | Status: SHIPPED | OUTPATIENT
Start: 2025-04-01 | End: 2026-04-01

## 2025-04-01 RX ORDER — ALENDRONATE SODIUM 70 MG/1
70 TABLET ORAL
Qty: 4 TABLET | Refills: 11 | Status: SHIPPED | OUTPATIENT
Start: 2025-04-01 | End: 2026-04-01

## 2025-04-01 NOTE — PROGRESS NOTES
Subjective     Patient ID: Zaira Bansal is a 74 y.o. female.    Chief Complaint: Annual Exam (General wellness exam)      HPI    Pt is known to me, PCP Dr. Marie.      Pt is a 74 year old female with HLD, osteopenia. She presents today for a checkup. Had labs prior to this visit, which we reviewed today. TSH was mildly elevated with normal T4, so we will recheck that in about 1 month. She does take a biotin supplement. Cholesterol was a bit elevated with an LDL of 140. She is hesitant to start a statin. Last DXA performed by her OB/GYN did show osteopenia with progression of her fracture risk compared to last DEXA. PCP suggested fosamax and patient is open to it.  Colonoscopy is scheduled for 5/13/25 with Dr. Martinez.     Review of Systems   All other systems reviewed and are negative.         Objective     Physical Exam  Vitals and nursing note reviewed.   Constitutional:       General: She is not in acute distress.     Appearance: Normal appearance. She is not ill-appearing, toxic-appearing or diaphoretic.   HENT:      Head: Normocephalic and atraumatic.      Right Ear: Tympanic membrane, ear canal and external ear normal. There is no impacted cerumen.      Left Ear: Tympanic membrane, ear canal and external ear normal. There is no impacted cerumen.      Nose: No congestion or rhinorrhea.      Mouth/Throat:      Pharynx: No oropharyngeal exudate or posterior oropharyngeal erythema.   Eyes:      General: No scleral icterus.        Right eye: No discharge.         Left eye: No discharge.      Conjunctiva/sclera: Conjunctivae normal.      Pupils: Pupils are equal, round, and reactive to light.   Neck:      Thyroid: No thyroid mass, thyromegaly or thyroid tenderness.   Cardiovascular:      Rate and Rhythm: Normal rate and regular rhythm.      Pulses: Normal pulses.      Heart sounds: Normal heart sounds. No murmur heard.     No friction rub. No gallop.   Pulmonary:      Effort: Pulmonary effort is normal. No  respiratory distress.      Breath sounds: Normal breath sounds. No stridor. No wheezing, rhonchi or rales.   Abdominal:      General: Abdomen is flat. Bowel sounds are normal.      Palpations: Abdomen is soft.   Musculoskeletal:         General: No deformity or signs of injury.      Cervical back: Normal range of motion and neck supple.      Right lower leg: No edema.      Left lower leg: No edema.   Lymphadenopathy:      Cervical: No cervical adenopathy.   Skin:     General: Skin is warm.      Coloration: Skin is not jaundiced or pale.      Findings: No lesion or rash.   Neurological:      General: No focal deficit present.      Mental Status: She is alert and oriented to person, place, and time. Mental status is at baseline.   Psychiatric:         Mood and Affect: Mood normal.         Behavior: Behavior normal.         Thought Content: Thought content normal.         Judgment: Judgment normal.       1. Preventative health care (Primary)  -discussed labs, colonoscopy scheduled    2. Osteopenia of both hips  - alendronate (FOSAMAX) 70 MG tablet; Take 1 tablet (70 mg total) by mouth every 7 days.  Dispense: 4 tablet; Refill: 11  -discuss proper med admin    3. Arthritis of left hand  - meloxicam (MOBIC) 7.5 MG tablet; Take 1 tablet (7.5 mg total) by mouth once daily. TAKE 1 TABLET(7.5 MG) BY mouth daily as needed for wrist pain  Dispense: 90 tablet; Refill: 3  -can refer to hand if needed    4. Subclinical hypothyroidism  - TSH; Future  -recheck in 4 weeks    5. Mixed hyperlipidemia  - Lipid Panel; Future  -recheck in 3 months    RTC/ER precautions given. F/U with pcp in 1 year    Meli Bond PA-C     DISPLAY PLAN FREE TEXT

## 2025-04-28 ENCOUNTER — PATIENT MESSAGE (OUTPATIENT)
Dept: FAMILY MEDICINE | Facility: CLINIC | Age: 75
End: 2025-04-28
Payer: MEDICARE

## 2025-04-28 DIAGNOSIS — M85.852 OSTEOPENIA OF BOTH HIPS: ICD-10-CM

## 2025-04-28 DIAGNOSIS — M85.851 OSTEOPENIA OF BOTH HIPS: ICD-10-CM

## 2025-04-28 RX ORDER — ALENDRONATE SODIUM 70 MG/1
70 TABLET ORAL
Qty: 12 TABLET | Refills: 3 | Status: SHIPPED | OUTPATIENT
Start: 2025-04-28 | End: 2026-04-28

## 2025-04-28 NOTE — TELEPHONE ENCOUNTER
Refill Routing Note   Medication(s) are not appropriate for processing by [facility] for the following reason(s):        Outside of protocol  Non-participating provider    [facility] action(s):  Route               Appointments  past 12m or future 3m with PCP    Date Provider   Last Visit   4/1/2025 Meli Bond PA-C   Next Visit   Visit date not found Meli Bond PA-C   ED visits in past 90 days: 0        Note composed:2:08 PM 04/28/2025

## 2025-05-02 ENCOUNTER — PATIENT MESSAGE (OUTPATIENT)
Dept: FAMILY MEDICINE | Facility: CLINIC | Age: 75
End: 2025-05-02
Payer: MEDICARE

## 2025-05-12 ENCOUNTER — ANESTHESIA EVENT (OUTPATIENT)
Dept: ENDOSCOPY | Facility: HOSPITAL | Age: 75
End: 2025-05-12
Payer: MEDICARE

## 2025-05-13 ENCOUNTER — HOSPITAL ENCOUNTER (OUTPATIENT)
Facility: HOSPITAL | Age: 75
Discharge: HOME | End: 2025-05-13
Attending: SURGERY | Admitting: SURGERY
Payer: MEDICARE

## 2025-05-13 ENCOUNTER — ANESTHESIA (OUTPATIENT)
Dept: ENDOSCOPY | Facility: HOSPITAL | Age: 75
End: 2025-05-13
Payer: MEDICARE

## 2025-05-13 VITALS
BODY MASS INDEX: 19.63 KG/M2 | DIASTOLIC BLOOD PRESSURE: 68 MMHG | SYSTOLIC BLOOD PRESSURE: 112 MMHG | WEIGHT: 104 LBS | HEIGHT: 61 IN | OXYGEN SATURATION: 98 % | HEART RATE: 68 BPM | RESPIRATION RATE: 16 BRPM | TEMPERATURE: 97 F

## 2025-05-13 DIAGNOSIS — Z12.11 ENCOUNTER FOR SCREENING COLONOSCOPY: ICD-10-CM

## 2025-05-13 DIAGNOSIS — Z12.11 SCREENING FOR COLON CANCER: ICD-10-CM

## 2025-05-13 PROBLEM — K63.89 MELANOSIS COLI: Status: ACTIVE | Noted: 2025-05-13

## 2025-05-13 PROCEDURE — 37000009 HC ANESTHESIA EA ADD 15 MINS: Mod: PO | Performed by: SURGERY

## 2025-05-13 PROCEDURE — 27201012 HC FORCEPS, HOT/COLD, DISP: Mod: PO | Performed by: SURGERY

## 2025-05-13 PROCEDURE — 45380 COLONOSCOPY AND BIOPSY: CPT | Mod: PT,PO | Performed by: SURGERY

## 2025-05-13 PROCEDURE — 37000008 HC ANESTHESIA 1ST 15 MINUTES: Mod: PO | Performed by: SURGERY

## 2025-05-13 PROCEDURE — 63600175 PHARM REV CODE 636 W HCPCS: Mod: PO | Performed by: SURGERY

## 2025-05-13 PROCEDURE — 45380 COLONOSCOPY AND BIOPSY: CPT | Mod: PT,,, | Performed by: SURGERY

## 2025-05-13 PROCEDURE — 63600175 PHARM REV CODE 636 W HCPCS: Mod: PO | Performed by: NURSE ANESTHETIST, CERTIFIED REGISTERED

## 2025-05-13 PROCEDURE — 88305 TISSUE EXAM BY PATHOLOGIST: CPT | Mod: TC | Performed by: SURGERY

## 2025-05-13 RX ORDER — SODIUM CHLORIDE, SODIUM LACTATE, POTASSIUM CHLORIDE, CALCIUM CHLORIDE 600; 310; 30; 20 MG/100ML; MG/100ML; MG/100ML; MG/100ML
INJECTION, SOLUTION INTRAVENOUS CONTINUOUS
Status: DISCONTINUED | OUTPATIENT
Start: 2025-05-13 | End: 2025-05-13 | Stop reason: HOSPADM

## 2025-05-13 RX ORDER — PROPOFOL 10 MG/ML
VIAL (ML) INTRAVENOUS
Status: DISCONTINUED | OUTPATIENT
Start: 2025-05-13 | End: 2025-05-13

## 2025-05-13 RX ORDER — LIDOCAINE HYDROCHLORIDE 20 MG/ML
INJECTION INTRAVENOUS
Status: DISCONTINUED | OUTPATIENT
Start: 2025-05-13 | End: 2025-05-13

## 2025-05-13 RX ADMIN — PROPOFOL 50 MG: 10 INJECTION, EMULSION INTRAVENOUS at 07:05

## 2025-05-13 RX ADMIN — PROPOFOL 100 MG: 10 INJECTION, EMULSION INTRAVENOUS at 07:05

## 2025-05-13 RX ADMIN — SODIUM CHLORIDE, POTASSIUM CHLORIDE, SODIUM LACTATE AND CALCIUM CHLORIDE: 600; 310; 30; 20 INJECTION, SOLUTION INTRAVENOUS at 06:05

## 2025-05-13 RX ADMIN — LIDOCAINE HYDROCHLORIDE 100 MG: 20 INJECTION INTRAVENOUS at 07:05

## 2025-05-13 NOTE — ANESTHESIA POSTPROCEDURE EVALUATION
Anesthesia Post Evaluation    Patient: Zaira Bansal    Procedure(s) Performed: Procedure(s) (LRB):  COLONOSCOPY, SCREENING, LOW RISK PATIENT (N/A)    Final Anesthesia Type: general      Patient location during evaluation: PACU  Patient participation: Yes- Able to Participate  Level of consciousness: awake  Post-procedure vital signs: reviewed and stable  Pain management: adequate  Airway patency: patent    PONV status at discharge: No PONV  Anesthetic complications: no      Cardiovascular status: blood pressure returned to baseline  Respiratory status: unassisted  Hydration status: euvolemic  Follow-up not needed.              Vitals Value Taken Time   /68 05/13/25 07:53   Temp  05/13/25 09:54   Pulse 68 05/13/25 07:53   Resp 16 05/13/25 07:53   SpO2 98 % 05/13/25 07:53         Event Time   Out of Recovery 07:59:00         Pain/Eulalio Score: Eulalio Score: 10 (5/13/2025  7:46 AM)

## 2025-05-13 NOTE — PROVATION PATIENT INSTRUCTIONS
Discharge Summary/Instructions after an Endoscopic Procedure  Patient Name: Zaira Bansal  Patient MRN: [id number]  Patient YOB: 1950  Tuesday, May 13, 2025  Edgar Martinez MD  Dear patient,  As a result of recent federal legislation (The Federal Cures Act), you may   receive lab or pathology results from your procedure in your [facility]   account before your physician is able to contact you. Your physician or   their representative will relay the results to you with their   recommendations at their soonest availability.  Thank you,  RESTRICTIONS:  During your procedure today, you received medications for sedation.  These   medications may affect your judgment, balance and coordination.  Therefore,   for 24 hours, you have the following restrictions:   - DO NOT drive a car, operate machinery, make legal/financial decisions,   sign important papers or drink alcohol.    ACTIVITY:  Today: no heavy lifting, straining or running due to procedural   sedation/anesthesia.  The following day: return to full activity including work.  DIET:  Eat and drink normally unless instructed otherwise.     TREATMENT FOR COMMON SIDE EFFECTS:  - Mild abdominal pain, nausea, belching, bloating or excessive gas:  rest,   eat lightly and use a heating pad.  - Sore Throat: treat with throat lozenges and/or gargle with warm salt   water.  - Because air was used during the procedure, expelling large amounts of air   from your rectum or belching is normal.  - If a bowel prep was taken, you may not have a bowel movement for 1-3 days.    This is normal.  SYMPTOMS TO WATCH FOR AND REPORT TO YOUR PHYSICIAN:  1. Abdominal pain or bloating, other than gas cramps.  2. Chest pain.  3. Back pain.  4. Signs of infection such as: chills or fever occurring within 24 hours   after the procedure.  5. Rectal bleeding, which would show as bright red, maroon, or black stools.   (A tablespoon of blood from the rectum is not serious, especially if    hemorrhoids are present.)  6. Vomiting.  7. Weakness or dizziness.  GO DIRECTLY TO THE NEAREST EMERGENCY ROOM IF YOU HAVE ANY OF THE FOLLOWING:      Difficulty breathing              Chills and/or fever over 101 F   Persistent vomiting and/or vomiting blood   Severe abdominal pain   Severe chest pain   Black, tarry stools   Bleeding- more than one tablespoon   Any other symptom or condition that you feel may need urgent attention  Your doctor recommends these additional instructions:  If any biopsies were taken, your doctors clinic will contact you in 1 to 2   weeks with any results.  You are being discharged to home.   Resume your regular diet.   Continue your present medications.   We are waiting for your pathology results.   Your physician has recommended a repeat colonoscopy in 10 years for   screening purposes.   Return to my office as needed.  For questions, problems or results please call your physician - Edgar Martinez MD at Work:  [phone].  EMERGENCY PHONE NUMBER: [phone], LAB RESULTS: [phone]  IF A COMPLICATION OR EMERGENCY SITUATION ARISES AND YOU ARE UNABLE TO REACH   YOUR PHYSICIAN - GO DIRECTLY TO THE EMERGENCY ROOM.  ___________________________________________  Nurse Signature  ___________________________________________  Patient/Designated Responsible Party Signature  Edgar Martinez MD  5/13/2025 7:28:50 AM  This report has been verified and signed electronically.  Dear patient,  As a result of recent federal legislation (The Federal Cures Act), you may   receive lab or pathology results from your procedure in your [facility]   account before your physician is able to contact you. Your physician or   their representative will relay the results to you with their   recommendations at their soonest availability.  Thank you.  PROVATION

## 2025-05-13 NOTE — H&P
"[facility] - Endoscopy  History & Physical     Subjective:     Chief Complaint/Reason for Admission: screening colonoscopy    History of Present Illness:   Patient 74 y.o. female presents for screening colonoscopy.  PT has not had a cscope in over 10 years.  Denies abd pain or changes in bowel habits.  She has no significant PMhx or pSHx.       Patient Active Problem List    Diagnosis Date Noted    LUIS EDUARDO (obstructive sleep apnea) 07/31/2024    Left wrist tendonitis 03/05/2024    Mixed hyperlipidemia 11/09/2020    Arthritis pain of hand 11/09/2020    Osteopenia 03/13/2019    Spasm of muscle, back 10/17/2017        Prescriptions Prior to Admission[1]  Review of patient's allergies indicates:   Allergen Reactions    Compazine [prochlorperazine edisylate]      Lock jaw        Past Medical History:   Diagnosis Date    Arthritis pain of hand 11/09/2020    Cataract     Dermatitis     Slurred speech 01/05/2021      Past Surgical History:   Procedure Laterality Date    DILATION AND CURETTAGE OF UTERUS      TONSILLECTOMY          Social History     Tobacco Use    Smoking status: Never    Smokeless tobacco: Never   Substance Use Topics    Alcohol use: Yes     Alcohol/week: 14.0 standard drinks of alcohol     Types: 14 Glasses of wine per week        Review of Systems:  A comprehensive review of systems was negative.    OBJECTIVE:     Patient Vitals for the past 8 hrs:   BP Temp Temp src Pulse Resp SpO2 Height Weight   05/13/25 0629 (!) 147/74 97.2 °F (36.2 °C) Skin 90 17 97 % 5' 1" (1.549 m) 47.2 kg (104 lb)     AAOx3  Sinus  Soft/nd/nt  No resp distress    Data Review:      ASSESSMENT/PLAN:     There are no hospital problems to display for this patient.    Screening cscope  Plan:  TO have cscope today         [1]   Medications Prior to Admission   Medication Sig Dispense Refill Last Dose/Taking    ALA-HIST IR 2 mg Tab Take 1 tablet by mouth every 6 (six) hours as needed.   Past Week    alendronate (FOSAMAX) 70 MG tablet Take 1 " tablet (70 mg total) by mouth every 7 days. 12 tablet 3 Past Week    clobetasol (CLOBEX) 0.05 % topical spray APPLY TO SKIN TWICE DAILY AS NEEDED FOR ITCHING. USE SPARINGLY, CAN CAUSE THINNING OF SKIN  0 Past Week    fluticasone propionate (FLONASE) 50 mcg/actuation nasal spray 1 spray (50 mcg total) by Each Nostril route once daily. 18.2 mL 0 Past Week    meloxicam (MOBIC) 7.5 MG tablet Take 1 tablet (7.5 mg total) by mouth once daily. TAKE 1 TABLET(7.5 MG) BY mouth daily as needed for wrist pain 90 tablet 3 Past Week    fexofenadine (ALLEGRA) 180 MG tablet Take 1 tablet (180 mg total) by mouth once daily. 30 tablet 0

## 2025-05-13 NOTE — TRANSFER OF CARE
"Anesthesia Transfer of Care Note    Patient: Zaira Bansal    Procedure(s) Performed: Procedure(s) (LRB):  COLONOSCOPY, SCREENING, LOW RISK PATIENT (N/A)    Patient location: PACU    Anesthesia Type: general    Transport from OR: Transported from OR on room air with adequate spontaneous ventilation    Post pain: adequate analgesia    Post assessment: no apparent anesthetic complications and tolerated procedure well    Post vital signs: stable    Level of consciousness: sedated    Nausea/Vomiting: no nausea/vomiting    Complications: none    Transfer of care protocol was followed      Last vitals: Visit Vitals  /60   Pulse 90   Temp 36.2 °C (97.2 °F) (Skin)   Resp 17   Ht 5' 1" (1.549 m)   Wt 47.2 kg (104 lb)   SpO2 97%   Breastfeeding No   BMI 19.65 kg/m²     " No

## 2025-05-13 NOTE — ANESTHESIA PREPROCEDURE EVALUATION
05/13/2025  Zaira Bansal is a 74 y.o., female.      Pre-op Assessment    I have reviewed the Patient Summary Reports.    I have reviewed the NPO Status.   I have reviewed the Medications.     Review of Systems  Anesthesia Hx:  No problems with previous Anesthesia                Cardiovascular:                hyperlipidemia                               Pulmonary:        Sleep Apnea     Obstructive Sleep Apnea (LUIS EDUARDO).           Neurological:  Neurology Normal                                          Physical Exam  General: Well nourished    Airway:  Mallampati: II   Mouth Opening: Normal  TM Distance: Normal  Neck ROM: Normal ROM        Anesthesia Plan  Type of Anesthesia, risks & benefits discussed:    Anesthesia Type: Gen Natural Airway  Intra-op Monitoring Plan: Standard ASA Monitors  Induction:  IV  Informed Consent: Informed consent signed with the Patient and all parties understand the risks and agree with anesthesia plan.  All questions answered.   ASA Score: 2    Ready For Surgery From Anesthesia Perspective.     .

## 2025-05-15 ENCOUNTER — OFFICE VISIT (OUTPATIENT)
Dept: OPTOMETRY | Facility: CLINIC | Age: 75
End: 2025-05-15
Payer: MEDICARE

## 2025-05-15 DIAGNOSIS — H25.13 NUCLEAR SCLEROSIS OF BOTH EYES: Primary | ICD-10-CM

## 2025-05-15 DIAGNOSIS — H04.123 BILATERAL DRY EYES: ICD-10-CM

## 2025-05-15 DIAGNOSIS — H52.7 REFRACTIVE ERROR: ICD-10-CM

## 2025-05-15 LAB
ESTROGEN SERPL-MCNC: NORMAL PG/ML
INSULIN SERPL-ACNC: NORMAL U[IU]/ML
LAB AP CLINICAL INFORMATION: NORMAL
LAB AP GROSS DESCRIPTION: NORMAL
LAB AP PERFORMING LOCATION(S): NORMAL
LAB AP REPORT FOOTNOTES: NORMAL

## 2025-05-15 PROCEDURE — 99999 PR PBB SHADOW E&M-EST. PATIENT-LVL II: CPT | Mod: PBBFAC,,, | Performed by: OPTOMETRIST

## 2025-05-15 PROCEDURE — 92014 COMPRE OPH EXAM EST PT 1/>: CPT | Mod: S$PBB,,, | Performed by: OPTOMETRIST

## 2025-05-15 PROCEDURE — 99212 OFFICE O/P EST SF 10 MIN: CPT | Mod: PBBFAC,PO | Performed by: OPTOMETRIST

## 2025-05-15 NOTE — PROGRESS NOTES
HPI     Annual Exam            Comments: Ldle 04/18/2024          Comments    Pt states vision is stable since last exam wears specs when needed for   driving   Using systane BID   Denies F/F           Last edited by Yelena Arce on 5/15/2025  2:27 PM.            Assessment /Plan     For exam results, see Encounter Report.    Nuclear sclerosis of both eyes    Bilateral dry eyes    Refractive error      Educated pt on presence of cataracts and effects on vision. No surgery at this time. Recheck in one year.  2. Recommend artificial tears. 1 drop 2x per day. Chronicity of disease and treatment discussed.  3. Copy of spec rx given

## 2025-05-22 ENCOUNTER — RESULTS FOLLOW-UP (OUTPATIENT)
Dept: SURGERY | Facility: CLINIC | Age: 75
End: 2025-05-22

## 2025-05-22 ENCOUNTER — TELEPHONE (OUTPATIENT)
Dept: SURGERY | Facility: CLINIC | Age: 75
End: 2025-05-22
Payer: MEDICARE

## 2025-05-22 NOTE — TELEPHONE ENCOUNTER
Called and reviewed pathology with pt.        Final Diagnosis   COLON, RANDOM, BIOPSY:   - Pseudomelanosis coli  - No evidence of acute/active inflammation or microscopic colitis  - No evidence of dysplasia       Recommend repeat cscope in 10 years

## 2025-05-27 ENCOUNTER — RESULTS FOLLOW-UP (OUTPATIENT)
Dept: FAMILY MEDICINE | Facility: CLINIC | Age: 75
End: 2025-05-27

## 2025-07-15 ENCOUNTER — RESULTS FOLLOW-UP (OUTPATIENT)
Dept: FAMILY MEDICINE | Facility: CLINIC | Age: 75
End: 2025-07-15
Payer: MEDICARE

## 2025-07-15 ENCOUNTER — PATIENT MESSAGE (OUTPATIENT)
Dept: FAMILY MEDICINE | Facility: CLINIC | Age: 75
End: 2025-07-15
Payer: MEDICARE

## 2025-07-15 DIAGNOSIS — R79.89 ELEVATED TSH: Primary | ICD-10-CM

## 2025-07-15 DIAGNOSIS — Z13.6 ENCOUNTER FOR SCREENING FOR CARDIOVASCULAR DISORDERS: Primary | ICD-10-CM

## 2025-07-15 DIAGNOSIS — R94.6 ABNORMAL RESULTS OF THYROID FUNCTION STUDIES: ICD-10-CM

## 2025-07-29 ENCOUNTER — HOSPITAL ENCOUNTER (OUTPATIENT)
Dept: RADIOLOGY | Facility: HOSPITAL | Age: 75
Discharge: HOME | End: 2025-07-29
Attending: PHYSICIAN ASSISTANT
Payer: MEDICARE

## 2025-07-29 ENCOUNTER — RESULTS FOLLOW-UP (OUTPATIENT)
Dept: FAMILY MEDICINE | Facility: CLINIC | Age: 75
End: 2025-07-29
Payer: MEDICARE

## 2025-07-29 DIAGNOSIS — E78.2 MIXED HYPERLIPIDEMIA: Primary | ICD-10-CM

## 2025-07-29 DIAGNOSIS — Z13.6 ENCOUNTER FOR SCREENING FOR CARDIOVASCULAR DISORDERS: ICD-10-CM

## 2025-07-29 PROCEDURE — 75571 CT HRT W/O DYE W/CA TEST: CPT | Mod: TC,PO

## 2025-07-29 PROCEDURE — 75571 CT HRT W/O DYE W/CA TEST: CPT | Mod: 26,,, | Performed by: STUDENT IN AN ORGANIZED HEALTH CARE EDUCATION/TRAINING PROGRAM

## 2025-08-06 RX ORDER — ROSUVASTATIN CALCIUM 5 MG/1
5 TABLET, COATED ORAL NIGHTLY
Qty: 90 TABLET | Refills: 3 | Status: SHIPPED | OUTPATIENT
Start: 2025-08-06 | End: 2026-08-06